# Patient Record
Sex: FEMALE | Race: BLACK OR AFRICAN AMERICAN | NOT HISPANIC OR LATINO | URBAN - METROPOLITAN AREA
[De-identification: names, ages, dates, MRNs, and addresses within clinical notes are randomized per-mention and may not be internally consistent; named-entity substitution may affect disease eponyms.]

---

## 2018-02-27 ENCOUNTER — EMERGENCY (EMERGENCY)
Facility: HOSPITAL | Age: 73
LOS: 1 days | Discharge: HOME | End: 2018-02-27
Attending: EMERGENCY MEDICINE
Payer: COMMERCIAL

## 2018-02-27 VITALS
OXYGEN SATURATION: 100 % | TEMPERATURE: 98 F | WEIGHT: 195.11 LBS | SYSTOLIC BLOOD PRESSURE: 140 MMHG | HEIGHT: 67 IN | HEART RATE: 77 BPM | DIASTOLIC BLOOD PRESSURE: 71 MMHG | RESPIRATION RATE: 16 BRPM

## 2018-02-27 PROCEDURE — 99283 EMERGENCY DEPT VISIT LOW MDM: CPT | Mod: 25

## 2018-02-27 PROCEDURE — 99284 EMERGENCY DEPT VISIT MOD MDM: CPT

## 2018-02-28 RX ORDER — POLYMYXIN B SULF/TRIMETHOPRIM 10000-1/ML
1 DROPS OPHTHALMIC (EYE)
Qty: 1 | Refills: 0
Start: 2018-02-28 | End: 2018-03-06

## 2018-02-28 NOTE — ED PROVIDER NOTE - MEDICAL DECISION MAKING DETAILS
71yo F presents with R eye irritation. Exam shows mild conjunctivitis. Will give rx abx drops and advised to f/u with ophthalmology

## 2018-02-28 NOTE — ED PROVIDER NOTE - OBJECTIVE STATEMENT
71 y/o F pt w/ no significant PMHx presents to ED c/o R eye irritation x3 days. Pt states that she used some Bengay and subsequently had rubbed her eye; since then pt has been feeling some irritation. Pt wears glasses but no contacts. Pt states that she noticed redness and slight swelling around her eye. Pt denies visual changes, discharge, or any other complaints. Pt reports no trauma. Pt is allergic to Penicillin (Rash).

## 2018-06-10 NOTE — ED PROVIDER NOTE - RESPIRATORY, MLM
-pain control with  morphine and dilauded   -CT scan with no evident of acute cervical spinal injury for neck pain and   -ct abd normal without stone Breath sounds clear and equal bilaterally.

## 2018-10-08 ENCOUNTER — APPOINTMENT (OUTPATIENT)
Dept: ORTHOPEDIC SURGERY | Facility: CLINIC | Age: 73
End: 2018-10-08
Payer: MEDICARE

## 2018-10-08 VITALS — BODY MASS INDEX: 31.39 KG/M2 | HEIGHT: 67 IN | WEIGHT: 200 LBS

## 2018-10-08 VITALS — SYSTOLIC BLOOD PRESSURE: 136 MMHG | HEART RATE: 70 BPM | DIASTOLIC BLOOD PRESSURE: 77 MMHG

## 2018-10-08 DIAGNOSIS — Z87.39 PERSONAL HISTORY OF OTHER DISEASES OF THE MUSCULOSKELETAL SYSTEM AND CONNECTIVE TISSUE: ICD-10-CM

## 2018-10-08 DIAGNOSIS — Z87.891 PERSONAL HISTORY OF NICOTINE DEPENDENCE: ICD-10-CM

## 2018-10-08 DIAGNOSIS — M75.101 UNSPECIFIED ROTATOR CUFF TEAR OR RUPTURE OF RIGHT SHOULDER, NOT SPECIFIED AS TRAUMATIC: ICD-10-CM

## 2018-10-08 DIAGNOSIS — M75.31 CALCIFIC TENDINITIS OF RIGHT SHOULDER: ICD-10-CM

## 2018-10-08 PROCEDURE — 73030 X-RAY EXAM OF SHOULDER: CPT | Mod: RT

## 2018-10-08 PROCEDURE — 99204 OFFICE O/P NEW MOD 45 MIN: CPT

## 2018-10-08 RX ORDER — DICLOFENAC SODIUM 10 MG/G
1 GEL TOPICAL DAILY
Qty: 1 | Refills: 0 | Status: ACTIVE | COMMUNITY
Start: 2018-10-08 | End: 1900-01-01

## 2019-07-31 ENCOUNTER — APPOINTMENT (OUTPATIENT)
Dept: ORTHOPEDIC SURGERY | Facility: CLINIC | Age: 74
End: 2019-07-31
Payer: MEDICARE

## 2019-07-31 VITALS
BODY MASS INDEX: 31.39 KG/M2 | HEART RATE: 79 BPM | SYSTOLIC BLOOD PRESSURE: 146 MMHG | HEIGHT: 67 IN | DIASTOLIC BLOOD PRESSURE: 73 MMHG | WEIGHT: 200 LBS

## 2019-07-31 DIAGNOSIS — M94.261 CHONDROMALACIA, RIGHT KNEE: ICD-10-CM

## 2019-07-31 PROCEDURE — 99214 OFFICE O/P EST MOD 30 MIN: CPT

## 2019-07-31 PROCEDURE — 73564 X-RAY EXAM KNEE 4 OR MORE: CPT | Mod: RT

## 2019-07-31 RX ORDER — ATENOLOL 50 MG/1
50 TABLET ORAL
Refills: 0 | Status: COMPLETED | COMMUNITY

## 2019-07-31 RX ORDER — DICLOFENAC SODIUM 10 MG/G
1 GEL TOPICAL DAILY
Qty: 1 | Refills: 2 | Status: ACTIVE | COMMUNITY
Start: 2019-07-31 | End: 1900-01-01

## 2019-07-31 RX ORDER — FOLIC ACID/VIT B COMPLEX AND C 400 MCG
TABLET ORAL
Refills: 0 | Status: COMPLETED | COMMUNITY

## 2019-07-31 RX ORDER — MENTHOL/CAMPHOR 0.5 %-0.5%
1000 LOTION (ML) TOPICAL
Refills: 0 | Status: COMPLETED | COMMUNITY

## 2019-07-31 RX ORDER — HYDROCHLOROTHIAZIDE AND TRIAMTERENE 25; 37.5 MG/1; MG/1
CAPSULE ORAL
Refills: 0 | Status: COMPLETED | COMMUNITY

## 2019-07-31 RX ORDER — DICLOFENAC SODIUM 16.05 MG/ML
1.5 SOLUTION TOPICAL
Qty: 1 | Refills: 0 | Status: ACTIVE | COMMUNITY
Start: 2019-07-31 | End: 1900-01-01

## 2019-07-31 RX ORDER — UBIDECARENONE 200 MG
500 CAPSULE ORAL
Refills: 0 | Status: COMPLETED | COMMUNITY

## 2019-07-31 NOTE — PHYSICAL EXAM
[de-identified] : 5 views of the affected Right knee (standing AP, flexing standing AP, 30degree flexed lateral, 0degree lateral, sunrise view)\par were ordered, obtained and evaluated by myself today and\par demonstrate:\par There is mild to moderate lateral narrowing\par Trace to small osteophytic lipping\par Trace suprapatellar effusion\par Osteophytes with mild patellofemoral joint space loss without evidence of tilt [or] subluxation on sunrise view\par Normal soft tissue density\par Otherwise normal osseous bone structure without fracture or dislocation [de-identified] : Physical Examination\par General: well nourished, in no acute distress, alert and oriented to person, place and time\par Psychiatric: normal mood and affect, no abnormal movements or speech patterns\par Eyes: vision intact - glasses\par Throat: no thyromegaly\par Lymph: no enlarged nodes, no lymphedema in extremity\par Respiratory: no wheezing, no shortness of breath with ambulation\par Cardiac: no cardiac leg swelling, 2+ peripheral pulses\par Neurology: normal gross sensation in extremities to light touch\par Abdomen: soft, non-tender, tympanic, no masses\par \par Musculoskeletal Examination\par Ambulation	+ antalgic gait, - assistive devices\par \par Knee			Right			Left\par General\par      Swelling/Deformity	normal			normal	\par      Skin			normal			normal\par      Erythema		-			-\par      Standing Alignment	neutral			neutral\par      Effusion		trace			none\par Range of Motion\par      Hip			full painless ROM		full painless ROM\par      Knee Flexion		125			130\par      Knee Extension	0			0\par Patella\par      J Sign		-			-\par      Quad Medial/Lateral	1/1 1/1\par      Apprehension		-			-\par      Alejandro's		+			-\par      Grind Sign		+			-\par      Crepitus		+			-\par Palpation\par      Medial Joint Line	+			-\par      Medial Fem Condyle	-			-\par      Lateral Joint Line	-			-\par      Quad Tendon		-			-\par      Patella Tendon	-			-\par      Medial Patella		-			-\par      Lateral Patella 	-			-\par      Posterior Knee	-			-\par Ligamentous\par      Varus @ 0° / 30°	-/-			-/-\par      Valgus @ 0° / 30°	-/-			-/-\par      Lachman		-			-\par      Pivot Shift		-			-\par      Anterior Drawer	-			-\par      Posterior Drawer	-			-\par Meniscus\par      Jeannie		=			-\par      Flexion Pinch		-			-\par Strength Examination/Atrophy\par      Hip Flexors 		5+			5+\par      Quadriceps		5+			5+\par      Hamstring		5+			5+\par      Tibialis Anterior	5+			5+\par      Achilles/Soleus	5+			5+\par Sensation\par      Deep Peroneal	normal			normal\par      Superficial Peroneal 	normal			normal\par      Sural  		normal			normal\par      Posterior Tibial 	normal			normal\par      Saphneous 		normal			normal\par Pulses\par      DP			2+			2+\par

## 2019-07-31 NOTE — HISTORY OF PRESENT ILLNESS
[de-identified] : CC Right knee\par \par HPI 75 yo female right HD presents with chronic onset of several months of activity related pain in the anterior Right knee [without injury]. The pain is worse, and rated a 8 out of 10, described as aching sharp burning throbbing, with radiation down the anterior calf. Rest makes the pain better and going up and down stairs makes the pain worse. The patient reports associated symptoms of of click locking grinding swelling. The patient - pain at night affecting sleep, and + similar pain previously.\par \par The patient has tried the following treatments:\par Activity modification	+\par Ice/Compression  	+\par Braces    		-\par Nsaids    		+\par Physical Therapy 	-\par Cortisone Injection	-\par Visco Injection		-\par Arthroscopy		-\par \par Review of Systems is positive for the above musculoskeletal symptoms and is otherwise non-contributory for general, constitutional, psychiatric, neurologic, HEENT, cardiac, respiratory, gastrointestinal, reproductive, lymphatic, and dermatologic complaints.\par \par Consult by Dr Anna Medina\par \par

## 2019-07-31 NOTE — DISCUSSION/SUMMARY
[de-identified] : Right knee osteophyte is predominantly in the patellofemoral and lateral compartments\par Possible medial compartment internal derangement\par \par The patient and I discussed the causes and progression of degenerative joint disease of the knee. Models, diagrams and drawings were used in the discussion. Treatment can include conservative non-operative management and surgical options. Conservative management includes weight loss, activity modification, physical therapy to improve motion and strength in the muscles around the knee and the body's core, PO and topical NSAIDs, corticosteroid and/or viscosupplementation intra-articular injections. If the patient fails to improve with non-operative management, surgical management is possible. Depending upon the patient's age, BMI, activity level, degree and location of arthrosis different surgical options are possible including arthroscopic debridement with chondroplasty, high-tibial osteotomy, unicondylar/partial arthroplasty, and total joint arthroplasty.\par Physical therapy was prescribed for knee ROM exercises, strengthening exercises, deep tissue massage, core strengthening, hip abductor strengthening, VMO strengthening, modalities PRN, and home exercises\par \par \par The patient was prescribed Diclofenac topical liquid/creme non-steroidal anti-inflammatory medication. 1-2 pumps twice daily and apply to area with pain. There is low systemic absorption of the medication but risks while reduced remain were discussed and include but not limited to renal damage and GI ulceration and bleeding. They were warned to stop the medication if worsening buring skin or gastric pain or dizziness or other side effects. Also to immediately stop the medication and seek appropriate medical attention if any severe stomach ache, gastritis, black/red vomit, black/red stools or any other medical concern.\par \par Patient declined injection\par \par The patient verifies their understanding the the visit, diagnosis and plan. They agree with the treatment plan and will contact the office with any questions or problems.\par Follow up\par PRN

## 2019-11-12 ENCOUNTER — APPOINTMENT (OUTPATIENT)
Dept: ORTHOPEDIC SURGERY | Facility: CLINIC | Age: 74
End: 2019-11-12
Payer: MEDICARE

## 2019-11-12 PROCEDURE — 20611 DRAIN/INJ JOINT/BURSA W/US: CPT | Mod: RT

## 2019-11-12 PROCEDURE — 99213 OFFICE O/P EST LOW 20 MIN: CPT | Mod: 25

## 2019-11-12 NOTE — DISCUSSION/SUMMARY
[de-identified] : Right knee osteophyte is predominantly in the patellofemoral and lateral compartments\par Possible lateral compartment internal derangement\par \par The patient and I discussed the causes and progression of degenerative joint disease of the knee. Models, diagrams and drawings were used in the discussion. Treatment can include conservative non-operative management and surgical options. Conservative management includes weight loss, activity modification, physical therapy to improve motion and strength in the muscles around the knee and the body's core, PO and topical NSAIDs, corticosteroid and/or viscosupplementation intra-articular injections. If the patient fails to improve with non-operative management, surgical management is possible. Depending upon the patient's age, BMI, activity level, degree and location of arthrosis different surgical options are possible including arthroscopic debridement with chondroplasty, high-tibial osteotomy, unicondylar/partial arthroplasty, and total joint arthroplasty.\par \par Physical therapy was prescribed for knee ROM exercises, strengthening exercises, deep tissue massage, core strengthening, hip abductor strengthening, VMO strengthening, modalities PRN, and home exercises\par \par Procedure Note:\par \par Verbal consent was obtained for an arthrocentesis and intra-articular corticosteroid injection of the RIGHT knee, after the risks and benefits were discussed with the patient. Potential adverse effects were discussed including but not limited to bleeding, skin/joint infection, local skin reactions including bleaching, bruising, stiffness, soreness, vasovagal episodes, transient hyperglycemia, avascular necrosis, pseudo-septic type reactions, post injection joint pain, allergic reaction to product or anesthetic and other rare but potential adverse effects along with benefits including decreased pain and improved stability prior to obtaining verbal informed consent. It was also discussed that for some patients the treatment is ineffective and there are no guarantees that the patient will experience improvement as the result of the injection. In rare occasions the injection can cause worsening of pain.\par \par The use of a Sonosite 15-6 MHz linear transducer with live ultrasound guidance of the knee was necessary given the patient's BMI and local body habitus overlying and obscuring the accurate identification of normal body bony anatomy used to identify the injection site and the depth of soft tissue envelope necessitating a longer than normal needle to reach the joint space, and to confirm the location of the needle tip and intra-articular delivery of the medication. Without the use of live ultrasound guidance the injection would have been more difficult and place the patient's neurovascular structures at risk from the longer needle needed to traverse the soft tissue envelope.\par \par A 6 inch bolster was placed underneath the knee to place the knee in a slightly flexed position. The superolateral injection site was identified using the ultrasound probe to identify the suprapatellar space and superior boarder of the patella first longitudinally and then transversely. The injection site was marked and prepped with a ChloraPrep swab and anesthetized with ethylchloride skin anesthesia. Using sterile technique a 20g 3 1/2 in spinal needle with 3 cc total of 1cc 1% lidocaine without epinephrine, 1cc 0.25% Marcaine without epinephrine and 1cc of 40mg/mL Kenalog was passed through the injection site towards the suprapatellar space under live ultrasound guidance and noted to penetrate the joint capsule. The medication was injected without resistance under live ultrasound visualization and noted to flow into the suprapatellar joint space. The injection site was sterilely dressed, there was minimal blood loss. The patient tolerated this procedure without any complications done by myself. Images were recorded and saved.\par \par The patient has been advised that if they notice any worsening of symptoms or any problems to contact me and seek care from a qualified medical professional. The patient was instructed to ice the knee and take NSAID medication on an as needed basis if the patient feels discomfort.\par \par The patient verifies their understanding the the visit, diagnosis and plan. They agree with the treatment plan and will contact the office with any questions or problems.\par Follow up\par PRN

## 2019-11-12 NOTE — PHYSICAL EXAM
[de-identified] : Physical Examination\par General: well nourished, in no acute distress, alert and oriented to person, place and time\par Psychiatric: normal mood and affect, no abnormal movements or speech patterns\par Eyes: vision intact - glasses\par Throat: no thyromegaly\par Lymph: no enlarged nodes, no lymphedema in extremity\par Respiratory: no wheezing, no shortness of breath with ambulation\par Cardiac: no cardiac leg swelling, 2+ peripheral pulses\par Neurology: normal gross sensation in extremities to light touch\par Abdomen: soft, non-tender, tympanic, no masses\par \par Musculoskeletal Examination\par Ambulation	+ antalgic gait, - assistive devices\par \par Knee			Right			Left\par General\par      Swelling/Deformity	normal			normal	\par      Skin			normal			normal\par      Erythema		-			-\par      Standing Alignment	neutral			neutral\par      Effusion		trace			none\par Range of Motion\par      Hip			full painless ROM		full painless ROM\par      Knee Flexion		100			130\par      Knee Extension	0			0\par Patella\par      J Sign		-			-\par      Quad Medial/Lateral	1/1 1/1\par      Apprehension		-			-\par      Alejandro's		+			-\par      Grind Sign		+			-\par      Crepitus		+			-\par Palpation\par      Medial Joint Line	-			-\par      Medial Fem Condyle	-			-\par      Lateral Joint Line	+			-\par      Quad Tendon		-			-\par      Patella Tendon	-			-\par      Medial Patella		-			-\par      Lateral Patella 	-			-\par      Posterior Knee	-			-\par Ligamentous\par      Varus @ 0° / 30°	-/-			-/-\par      Valgus @ 0° / 30°	-/-			-/-\par      Lachman		-			-\par      Pivot Shift		-			-\par      Anterior Drawer	-			-\par      Posterior Drawer	-			-\par Meniscus\par      Jeannie		=/+ lateral			-\par      Flexion Pinch		-			-\par Strength Examination/Atrophy\par      Hip Flexors 		5+			5+\par      Quadriceps		5+			5+\par      Hamstring		5+			5+\par      Tibialis Anterior	5+			5+\par      Achilles/Soleus	5+			5+\par Sensation\par      Deep Peroneal	normal			normal\par      Superficial Peroneal 	normal			normal\par      Sural  		normal			normal\par      Posterior Tibial 	normal			normal\par      Saphneous 		normal			normal\par Pulses\par      DP			2+			2+\par  [de-identified] : 5 views of the affected Right knee (standing AP, flexing standing AP, 30degree flexed lateral, 0degree lateral, sunrise view)\par 7-31-19\par demonstrate:\par There is mild to moderate lateral narrowing\par Trace to small osteophytic lipping\par Trace suprapatellar effusion\par Osteophytes with mild patellofemoral joint space loss without evidence of tilt [or] subluxation on sunrise view\par Normal soft tissue density\par Otherwise normal osseous bone structure without fracture or dislocation

## 2019-11-12 NOTE — HISTORY OF PRESENT ILLNESS
[de-identified] : CC Right knee\par \par HPI 73 yo female right HD presents for fu of chronic onset of several months of activity related pain in the anterior Right knee [without injury]. The pain is worse, and rated a 8 out of 10, described as aching sharp burning throbbing, with radiation down the anterior calf. Rest makes the pain better and going up and down stairs makes the pain worse. The patient reports associated symptoms of of click locking grinding swelling. The patient - pain at night affecting sleep, and + similar pain previously.\par \par The patient has tried the following treatments:\par Activity modification	+\par Ice/Compression  	+\par Braces    		-\par Nsaids    		+\par Physical Therapy 	-\par Cortisone Injection	-\par Visco Injection		-\par Arthroscopy		-\par \par pain worse, no PT or topical nsaids\par \par Review of Systems is positive for the above musculoskeletal symptoms and is otherwise non-contributory for general, constitutional, psychiatric, neurologic, HEENT, cardiac, respiratory, gastrointestinal, reproductive, lymphatic, and dermatologic complaints.\par \par Consult by Dr Anna Medina\par \par

## 2020-04-09 ENCOUNTER — EMERGENCY (EMERGENCY)
Facility: HOSPITAL | Age: 75
LOS: 1 days | Discharge: HOME | End: 2020-04-09
Attending: EMERGENCY MEDICINE
Payer: COMMERCIAL

## 2020-04-09 VITALS
SYSTOLIC BLOOD PRESSURE: 122 MMHG | OXYGEN SATURATION: 98 % | WEIGHT: 195.11 LBS | HEART RATE: 70 BPM | DIASTOLIC BLOOD PRESSURE: 66 MMHG | RESPIRATION RATE: 19 BRPM | HEIGHT: 67 IN | TEMPERATURE: 98 F

## 2020-04-09 LAB
ALBUMIN SERPL ELPH-MCNC: 3.6 G/DL — SIGNIFICANT CHANGE UP (ref 3.5–5)
ALP SERPL-CCNC: 70 U/L — SIGNIFICANT CHANGE UP (ref 40–120)
ALT FLD-CCNC: 21 U/L DA — SIGNIFICANT CHANGE UP (ref 10–60)
ANION GAP SERPL CALC-SCNC: 6 MMOL/L — SIGNIFICANT CHANGE UP (ref 5–17)
APPEARANCE UR: CLEAR — SIGNIFICANT CHANGE UP
AST SERPL-CCNC: 18 U/L — SIGNIFICANT CHANGE UP (ref 10–40)
BASOPHILS # BLD AUTO: 0.02 K/UL — SIGNIFICANT CHANGE UP (ref 0–0.2)
BASOPHILS NFR BLD AUTO: 0.2 % — SIGNIFICANT CHANGE UP (ref 0–2)
BILIRUB SERPL-MCNC: 0.6 MG/DL — SIGNIFICANT CHANGE UP (ref 0.2–1.2)
BILIRUB UR-MCNC: NEGATIVE — SIGNIFICANT CHANGE UP
BUN SERPL-MCNC: 11 MG/DL — SIGNIFICANT CHANGE UP (ref 7–18)
CALCIUM SERPL-MCNC: 9.4 MG/DL — SIGNIFICANT CHANGE UP (ref 8.4–10.5)
CHLORIDE SERPL-SCNC: 101 MMOL/L — SIGNIFICANT CHANGE UP (ref 96–108)
CO2 SERPL-SCNC: 31 MMOL/L — SIGNIFICANT CHANGE UP (ref 22–31)
COLOR SPEC: YELLOW — SIGNIFICANT CHANGE UP
CREAT SERPL-MCNC: 0.75 MG/DL — SIGNIFICANT CHANGE UP (ref 0.5–1.3)
DIFF PNL FLD: NEGATIVE — SIGNIFICANT CHANGE UP
EOSINOPHIL # BLD AUTO: 0.02 K/UL — SIGNIFICANT CHANGE UP (ref 0–0.5)
EOSINOPHIL NFR BLD AUTO: 0.2 % — SIGNIFICANT CHANGE UP (ref 0–6)
GLUCOSE SERPL-MCNC: 112 MG/DL — HIGH (ref 70–99)
GLUCOSE UR QL: NEGATIVE — SIGNIFICANT CHANGE UP
HCT VFR BLD CALC: 40.9 % — SIGNIFICANT CHANGE UP (ref 34.5–45)
HGB BLD-MCNC: 13.2 G/DL — SIGNIFICANT CHANGE UP (ref 11.5–15.5)
IMM GRANULOCYTES NFR BLD AUTO: 0.3 % — SIGNIFICANT CHANGE UP (ref 0–1.5)
KETONES UR-MCNC: NEGATIVE — SIGNIFICANT CHANGE UP
LEUKOCYTE ESTERASE UR-ACNC: ABNORMAL
LIDOCAIN IGE QN: 110 U/L — SIGNIFICANT CHANGE UP (ref 73–393)
LYMPHOCYTES # BLD AUTO: 0.93 K/UL — LOW (ref 1–3.3)
LYMPHOCYTES # BLD AUTO: 10.7 % — LOW (ref 13–44)
MCHC RBC-ENTMCNC: 26.9 PG — LOW (ref 27–34)
MCHC RBC-ENTMCNC: 32.3 GM/DL — SIGNIFICANT CHANGE UP (ref 32–36)
MCV RBC AUTO: 83.5 FL — SIGNIFICANT CHANGE UP (ref 80–100)
MONOCYTES # BLD AUTO: 0.48 K/UL — SIGNIFICANT CHANGE UP (ref 0–0.9)
MONOCYTES NFR BLD AUTO: 5.5 % — SIGNIFICANT CHANGE UP (ref 2–14)
NEUTROPHILS # BLD AUTO: 7.24 K/UL — SIGNIFICANT CHANGE UP (ref 1.8–7.4)
NEUTROPHILS NFR BLD AUTO: 83.1 % — HIGH (ref 43–77)
NITRITE UR-MCNC: NEGATIVE — SIGNIFICANT CHANGE UP
NRBC # BLD: 0 /100 WBCS — SIGNIFICANT CHANGE UP (ref 0–0)
NT-PROBNP SERPL-SCNC: 87 PG/ML — SIGNIFICANT CHANGE UP (ref 0–450)
PH UR: 7 — SIGNIFICANT CHANGE UP (ref 5–8)
PLATELET # BLD AUTO: 243 K/UL — SIGNIFICANT CHANGE UP (ref 150–400)
POTASSIUM SERPL-MCNC: 3.1 MMOL/L — LOW (ref 3.5–5.3)
POTASSIUM SERPL-SCNC: 3.1 MMOL/L — LOW (ref 3.5–5.3)
PROT SERPL-MCNC: 8.3 G/DL — SIGNIFICANT CHANGE UP (ref 6–8.3)
PROT UR-MCNC: NEGATIVE — SIGNIFICANT CHANGE UP
RBC # BLD: 4.9 M/UL — SIGNIFICANT CHANGE UP (ref 3.8–5.2)
RBC # FLD: 13.6 % — SIGNIFICANT CHANGE UP (ref 10.3–14.5)
SARS-COV-2 RNA SPEC QL NAA+PROBE: SIGNIFICANT CHANGE UP
SODIUM SERPL-SCNC: 138 MMOL/L — SIGNIFICANT CHANGE UP (ref 135–145)
SP GR SPEC: 1 — LOW (ref 1.01–1.02)
TROPONIN I SERPL-MCNC: <0.015 NG/ML — SIGNIFICANT CHANGE UP (ref 0–0.04)
TSH SERPL-MCNC: 0.77 UU/ML — SIGNIFICANT CHANGE UP (ref 0.34–4.82)
UROBILINOGEN FLD QL: NEGATIVE — SIGNIFICANT CHANGE UP
WBC # BLD: 8.72 K/UL — SIGNIFICANT CHANGE UP (ref 3.8–10.5)
WBC # FLD AUTO: 8.72 K/UL — SIGNIFICANT CHANGE UP (ref 3.8–10.5)

## 2020-04-09 PROCEDURE — 36415 COLL VENOUS BLD VENIPUNCTURE: CPT

## 2020-04-09 PROCEDURE — 87635 SARS-COV-2 COVID-19 AMP PRB: CPT

## 2020-04-09 PROCEDURE — 71046 X-RAY EXAM CHEST 2 VIEWS: CPT | Mod: 26

## 2020-04-09 PROCEDURE — 99283 EMERGENCY DEPT VISIT LOW MDM: CPT | Mod: 25

## 2020-04-09 PROCEDURE — 85027 COMPLETE CBC AUTOMATED: CPT

## 2020-04-09 PROCEDURE — 83690 ASSAY OF LIPASE: CPT

## 2020-04-09 PROCEDURE — 93010 ELECTROCARDIOGRAM REPORT: CPT

## 2020-04-09 PROCEDURE — 87086 URINE CULTURE/COLONY COUNT: CPT

## 2020-04-09 PROCEDURE — 80053 COMPREHEN METABOLIC PANEL: CPT

## 2020-04-09 PROCEDURE — 81001 URINALYSIS AUTO W/SCOPE: CPT

## 2020-04-09 PROCEDURE — 84443 ASSAY THYROID STIM HORMONE: CPT

## 2020-04-09 PROCEDURE — 83880 ASSAY OF NATRIURETIC PEPTIDE: CPT

## 2020-04-09 PROCEDURE — 84484 ASSAY OF TROPONIN QUANT: CPT

## 2020-04-09 PROCEDURE — 99283 EMERGENCY DEPT VISIT LOW MDM: CPT

## 2020-04-09 PROCEDURE — 71046 X-RAY EXAM CHEST 2 VIEWS: CPT

## 2020-04-09 PROCEDURE — 93005 ELECTROCARDIOGRAM TRACING: CPT

## 2020-04-09 RX ORDER — NITROFURANTOIN MACROCRYSTAL 50 MG
1 CAPSULE ORAL
Qty: 14 | Refills: 0
Start: 2020-04-09 | End: 2020-04-15

## 2020-04-09 NOTE — ED PROVIDER NOTE - CLINICAL SUMMARY MEDICAL DECISION MAKING FREE TEXT BOX
75 year old female with feeling weak and back/body pains. vitals WNL. PE as above. 75 year old female with feeling weak and back/body pains. vitals WNL. PE as above.  ecg unremarkable. cxr WNL. labs WNL. ua with +uti. feels well in ED. will dc. f/u with PMD. return precautions discussed.

## 2020-04-09 NOTE — ED PROVIDER NOTE - OBJECTIVE STATEMENT
75 year old female PMH HTN coming in with few days of fatigue, body aches, mild nausea, and left sided abd/flank pain that has resolved. states just "feels off." Denies fevers, chills, sweats, cough, urinary complaints, V/D/C. no sick contacts. no recent travel. [facility] has a tele medicine call with PMD tomorrow but wasn't feeling well so came in for eval.

## 2020-04-09 NOTE — ED ADULT NURSE NOTE - NSIMPLEMENTINTERV_GEN_ALL_ED
Implemented All Universal Safety Interventions:  [place] to call system. Call bell, personal items and telephone within reach. Instruct patient to call for assistance. Room bathroom lighting operational. Non-slip footwear when patient is off stretcher. Physically safe environment: no spills, clutter or unnecessary equipment. Stretcher in lowest position, wheels locked, appropriate side rails in place.

## 2020-04-09 NOTE — ED PROVIDER NOTE - PATIENT PORTAL LINK FT
You can access the FollowMyHealth Patient Portal offered by [facility] by registering at the following website: [email or web address]. By joining [employer]’s FollowMyHealth portal, you will also be able to view your health information using other applications (apps) compatible with our system.

## 2020-04-10 LAB
CULTURE RESULTS: SIGNIFICANT CHANGE UP
SPECIMEN SOURCE: SIGNIFICANT CHANGE UP

## 2020-04-20 ENCOUNTER — EMERGENCY (EMERGENCY)
Facility: HOSPITAL | Age: 75
LOS: 1 days | Discharge: HOME | End: 2020-04-20
Attending: STUDENT IN AN ORGANIZED HEALTH CARE EDUCATION/TRAINING PROGRAM
Payer: COMMERCIAL

## 2020-04-20 VITALS
HEIGHT: 67 IN | RESPIRATION RATE: 18 BRPM | OXYGEN SATURATION: 99 % | TEMPERATURE: 98 F | WEIGHT: 190.04 LBS | DIASTOLIC BLOOD PRESSURE: 80 MMHG | HEART RATE: 74 BPM | SYSTOLIC BLOOD PRESSURE: 175 MMHG

## 2020-04-20 NOTE — ED ADULT TRIAGE NOTE - CHIEF COMPLAINT QUOTE
Left flank pain for 2 days, no N/V, hematuria, or fever. Finished antibiotics on thursday for UTI, also c/o of high /100 since flank pain

## 2020-04-21 VITALS
OXYGEN SATURATION: 100 % | TEMPERATURE: 98 F | SYSTOLIC BLOOD PRESSURE: 122 MMHG | DIASTOLIC BLOOD PRESSURE: 69 MMHG | RESPIRATION RATE: 17 BRPM | HEART RATE: 69 BPM

## 2020-04-21 LAB
ALBUMIN SERPL ELPH-MCNC: 3.3 G/DL — LOW (ref 3.5–5)
ALP SERPL-CCNC: 72 U/L — SIGNIFICANT CHANGE UP (ref 40–120)
ALT FLD-CCNC: 22 U/L DA — SIGNIFICANT CHANGE UP (ref 10–60)
ANION GAP SERPL CALC-SCNC: 5 MMOL/L — SIGNIFICANT CHANGE UP (ref 5–17)
APPEARANCE UR: CLEAR — SIGNIFICANT CHANGE UP
APTT BLD: 33.9 SEC — SIGNIFICANT CHANGE UP (ref 27.5–36.3)
AST SERPL-CCNC: 16 U/L — SIGNIFICANT CHANGE UP (ref 10–40)
BASOPHILS # BLD AUTO: 0.02 K/UL — SIGNIFICANT CHANGE UP (ref 0–0.2)
BASOPHILS NFR BLD AUTO: 0.2 % — SIGNIFICANT CHANGE UP (ref 0–2)
BILIRUB SERPL-MCNC: 0.4 MG/DL — SIGNIFICANT CHANGE UP (ref 0.2–1.2)
BILIRUB UR-MCNC: NEGATIVE — SIGNIFICANT CHANGE UP
BUN SERPL-MCNC: 7 MG/DL — SIGNIFICANT CHANGE UP (ref 7–18)
CALCIUM SERPL-MCNC: 9 MG/DL — SIGNIFICANT CHANGE UP (ref 8.4–10.5)
CHLORIDE SERPL-SCNC: 101 MMOL/L — SIGNIFICANT CHANGE UP (ref 96–108)
CO2 SERPL-SCNC: 33 MMOL/L — HIGH (ref 22–31)
COLOR SPEC: YELLOW — SIGNIFICANT CHANGE UP
CREAT SERPL-MCNC: 0.79 MG/DL — SIGNIFICANT CHANGE UP (ref 0.5–1.3)
DIFF PNL FLD: NEGATIVE — SIGNIFICANT CHANGE UP
EOSINOPHIL # BLD AUTO: 0.07 K/UL — SIGNIFICANT CHANGE UP (ref 0–0.5)
EOSINOPHIL NFR BLD AUTO: 0.8 % — SIGNIFICANT CHANGE UP (ref 0–6)
GLUCOSE SERPL-MCNC: 101 MG/DL — HIGH (ref 70–99)
GLUCOSE UR QL: NEGATIVE — SIGNIFICANT CHANGE UP
HCT VFR BLD CALC: 39.4 % — SIGNIFICANT CHANGE UP (ref 34.5–45)
HGB BLD-MCNC: 12.6 G/DL — SIGNIFICANT CHANGE UP (ref 11.5–15.5)
IMM GRANULOCYTES NFR BLD AUTO: 0.1 % — SIGNIFICANT CHANGE UP (ref 0–1.5)
INR BLD: 1.09 RATIO — SIGNIFICANT CHANGE UP (ref 0.88–1.16)
KETONES UR-MCNC: NEGATIVE — SIGNIFICANT CHANGE UP
LEUKOCYTE ESTERASE UR-ACNC: NEGATIVE — SIGNIFICANT CHANGE UP
LYMPHOCYTES # BLD AUTO: 1.38 K/UL — SIGNIFICANT CHANGE UP (ref 1–3.3)
LYMPHOCYTES # BLD AUTO: 15.2 % — SIGNIFICANT CHANGE UP (ref 13–44)
MCHC RBC-ENTMCNC: 26.9 PG — LOW (ref 27–34)
MCHC RBC-ENTMCNC: 32 GM/DL — SIGNIFICANT CHANGE UP (ref 32–36)
MCV RBC AUTO: 84.2 FL — SIGNIFICANT CHANGE UP (ref 80–100)
MONOCYTES # BLD AUTO: 0.69 K/UL — SIGNIFICANT CHANGE UP (ref 0–0.9)
MONOCYTES NFR BLD AUTO: 7.6 % — SIGNIFICANT CHANGE UP (ref 2–14)
NEUTROPHILS # BLD AUTO: 6.91 K/UL — SIGNIFICANT CHANGE UP (ref 1.8–7.4)
NEUTROPHILS NFR BLD AUTO: 76.1 % — SIGNIFICANT CHANGE UP (ref 43–77)
NITRITE UR-MCNC: NEGATIVE — SIGNIFICANT CHANGE UP
NRBC # BLD: 0 /100 WBCS — SIGNIFICANT CHANGE UP (ref 0–0)
PH UR: 6 — SIGNIFICANT CHANGE UP (ref 5–8)
PLATELET # BLD AUTO: 264 K/UL — SIGNIFICANT CHANGE UP (ref 150–400)
POTASSIUM SERPL-MCNC: 3.4 MMOL/L — LOW (ref 3.5–5.3)
POTASSIUM SERPL-SCNC: 3.4 MMOL/L — LOW (ref 3.5–5.3)
PROT SERPL-MCNC: 7.8 G/DL — SIGNIFICANT CHANGE UP (ref 6–8.3)
PROT UR-MCNC: NEGATIVE — SIGNIFICANT CHANGE UP
PROTHROM AB SERPL-ACNC: 12.3 SEC — SIGNIFICANT CHANGE UP (ref 10–12.9)
RBC # BLD: 4.68 M/UL — SIGNIFICANT CHANGE UP (ref 3.8–5.2)
RBC # FLD: 14.1 % — SIGNIFICANT CHANGE UP (ref 10.3–14.5)
SODIUM SERPL-SCNC: 139 MMOL/L — SIGNIFICANT CHANGE UP (ref 135–145)
SP GR SPEC: 1 — LOW (ref 1.01–1.02)
UROBILINOGEN FLD QL: NEGATIVE — SIGNIFICANT CHANGE UP
WBC # BLD: 9.08 K/UL — SIGNIFICANT CHANGE UP (ref 3.8–10.5)
WBC # FLD AUTO: 9.08 K/UL — SIGNIFICANT CHANGE UP (ref 3.8–10.5)

## 2020-04-21 PROCEDURE — 85027 COMPLETE CBC AUTOMATED: CPT

## 2020-04-21 PROCEDURE — 80053 COMPREHEN METABOLIC PANEL: CPT

## 2020-04-21 PROCEDURE — 36415 COLL VENOUS BLD VENIPUNCTURE: CPT

## 2020-04-21 PROCEDURE — 81003 URINALYSIS AUTO W/O SCOPE: CPT

## 2020-04-21 PROCEDURE — 85730 THROMBOPLASTIN TIME PARTIAL: CPT

## 2020-04-21 PROCEDURE — 85610 PROTHROMBIN TIME: CPT

## 2020-04-21 PROCEDURE — 87086 URINE CULTURE/COLONY COUNT: CPT

## 2020-04-21 PROCEDURE — 99283 EMERGENCY DEPT VISIT LOW MDM: CPT

## 2020-04-21 PROCEDURE — 99284 EMERGENCY DEPT VISIT MOD MDM: CPT

## 2020-04-21 NOTE — ED PROVIDER NOTE - PHYSICAL EXAMINATION
NAD  EOMI  airway patent  +S1S2 no mrg  CTA b/l  soft nt nd  no le edema;  normal mood and affect, pleasant  no cva b/l, no rash;

## 2020-04-21 NOTE — ED PROVIDER NOTE - CLINICAL SUMMARY MEDICAL DECISION MAKING FREE TEXT BOX
transient r flank pain, improved dysuria, tolerating po. recent culture negative, benign exam, not hypoxic, symptoms likely related to covid, low suspicion pyleo given presentation and recent urine culture, will dc with pcp follow up and strict return precautions

## 2020-04-21 NOTE — ED ADULT NURSE NOTE - OBJECTIVE STATEMENT
RN JASSON SY COVERING NOTES: AOX4 +ambulatory patient reports L flank pain x 2 days. Patient states finished abx for for UTI

## 2020-04-21 NOTE — ED PROVIDER NOTE - OBJECTIVE STATEMENT
75f with h/o recent likely covid infection (tested negative but lymphopenia and symptoms suggestive of), dysuria s/p abx, now p/ intermittetn r lower back pain; no dysuria/hematuria/cough/cold/diarrhea/dysuria

## 2020-04-21 NOTE — ED PROVIDER NOTE - PATIENT PORTAL LINK FT
You can access the FollowMyHealth Patient Portal offered by [facility] by registering at the following website: [email or web address]. By joining [employer]’s FollowMyHealth portal, you will also be able to view your health information using other applications (apps) compatible with our system.

## 2020-04-22 LAB
CULTURE RESULTS: SIGNIFICANT CHANGE UP
SPECIMEN SOURCE: SIGNIFICANT CHANGE UP

## 2020-06-12 ENCOUNTER — RESULT REVIEW (OUTPATIENT)
Age: 75
End: 2020-06-12

## 2020-09-11 ENCOUNTER — APPOINTMENT (OUTPATIENT)
Dept: ORTHOPEDIC SURGERY | Facility: CLINIC | Age: 75
End: 2020-09-11
Payer: MEDICARE

## 2020-09-11 VITALS
BODY MASS INDEX: 30.61 KG/M2 | WEIGHT: 195 LBS | OXYGEN SATURATION: 98 % | HEIGHT: 67 IN | DIASTOLIC BLOOD PRESSURE: 82 MMHG | SYSTOLIC BLOOD PRESSURE: 148 MMHG | HEART RATE: 71 BPM

## 2020-09-11 DIAGNOSIS — M17.11 UNILATERAL PRIMARY OSTEOARTHRITIS, RIGHT KNEE: ICD-10-CM

## 2020-09-11 PROCEDURE — 73564 X-RAY EXAM KNEE 4 OR MORE: CPT | Mod: RT

## 2020-09-11 PROCEDURE — 20611 DRAIN/INJ JOINT/BURSA W/US: CPT | Mod: RT

## 2020-09-11 PROCEDURE — 99214 OFFICE O/P EST MOD 30 MIN: CPT | Mod: 25

## 2020-09-11 RX ORDER — DICLOFENAC SODIUM 20 MG/G
2 SOLUTION TOPICAL
Qty: 1 | Refills: 0 | Status: ACTIVE | COMMUNITY
Start: 2020-09-11 | End: 1900-01-01

## 2020-09-11 NOTE — HISTORY OF PRESENT ILLNESS
[de-identified] : CC Right knee\par \par HPI 73 yo female right HD presents for pt and csi fu of chronic onset of several months of activity related pain in the anterior Right knee [without injury]. The pain is worse, and rated a 8 out of 10, described as aching sharp burning throbbing, with radiation down the anterior calf. Rest makes the pain better and going up and down stairs makes the pain worse. The patient reports associated symptoms of of click locking grinding swelling. The patient - pain at night affecting sleep, and + similar pain previously.\par \par The patient has tried the following treatments:\par Activity modification	+\par Ice/Compression  	+\par Braces    		-\par Nsaids    		+\par Physical Therapy 	- \par Cortisone Injection	\par Visco Injection		-\par Arthroscopy		-\par \par 6-7 months improvement with CSI didn't do PT\par knee feels weak\par \par Review of Systems is positive for the above musculoskeletal symptoms and is otherwise non-contributory for general, constitutional, psychiatric, neurologic, HEENT, cardiac, respiratory, gastrointestinal, reproductive, lymphatic, and dermatologic complaints.\par \par Consult by Dr Anna Medina\par \par

## 2020-09-11 NOTE — PHYSICAL EXAM
[de-identified] : Physical Examination\par General: well nourished, in no acute distress, alert and oriented to person, place and time\par Psychiatric: normal mood and affect, no abnormal movements or speech patterns\par Eyes: vision intact - glasses\par Throat: no thyromegaly\par Lymph: no enlarged nodes, no lymphedema in extremity\par Respiratory: no wheezing, no shortness of breath with ambulation\par Cardiac: no cardiac leg swelling, 2+ peripheral pulses\par Neurology: normal gross sensation in extremities to light touch\par Abdomen: soft, non-tender, tympanic, no masses\par \par Musculoskeletal Examination\par Ambulation	+ antalgic gait, - assistive devices\par \par Knee			Right			Left\par General\par      Swelling/Deformity	normal			normal	\par      Skin			normal			normal\par      Erythema		-			-\par      Standing Alignment	neutral			neutral\par      Effusion		trace			none\par Range of Motion\par      Hip			full painless ROM		full painless ROM\par      Knee Flexion		100			130\par      Knee Extension	0			0\par Patella\par      J Sign		-			-\par      Quad Medial/Lateral	1/1 1/1\par      Apprehension		-			-\par      Alejandro's		+			-\par      Grind Sign		+			-\par      Crepitus		+			-\par Palpation\par      Medial Joint Line	-			-\par      Medial Fem Condyle	-			-\par      Lateral Joint Line	+			-\par      Quad Tendon		-			-\par      Patella Tendon	-			-\par      Medial Patella		-			-\par      Lateral Patella 	-			-\par      Posterior Knee	-			-\par Ligamentous\par      Varus @ 0° / 30°	-/-			-/-\par      Valgus @ 0° / 30°	-/-			-/-\par      Lachman		-			-\par      Pivot Shift		-			-\par      Anterior Drawer	-			-\par      Posterior Drawer	-			-\par Meniscus\par      Jeannie		=/+ lateral			-\par      Flexion Pinch		-			-\par Strength Examination/Atrophy\par      Hip Flexors 		5+			5+\par      Quadriceps		5+			5+\par      Hamstring		5+			5+\par      Tibialis Anterior	5+			5+\par      Achilles/Soleus	5+			5+\par Sensation\par      Deep Peroneal	normal			normal\par      Superficial Peroneal 	normal			normal\par      Sural  		normal			normal\par      Posterior Tibial 	normal			normal\par      Saphneous 		normal			normal\par Pulses\par      DP			2+			2+\par  [de-identified] : 5 views of the affected Right knee (standing AP, flexing standing AP, 30degree flexed lateral, 0degree lateral, sunrise view)\par 7-31-19\par demonstrate:\par There is mild to moderate lateral narrowing\par Trace to small osteophytic lipping\par Trace suprapatellar effusion\par Osteophytes with mild patellofemoral joint space loss without evidence of tilt [or] subluxation on sunrise view\par Normal soft tissue density\par Otherwise normal osseous bone structure without fracture or dislocation\par \par 5 views of the affected Right knee (standing AP, flexing standing AP, 30degree flexed lateral, 0degree lateral, sunrise view)\par were ordered, obtained and evaluated by myself today and\par demonstrate:\par There is moderate lateral narrowing\par Trace to small osteophytic lipping\par Small suprapatellar effusion\par Osteophytes with mild patellofemoral joint space loss without evidence of tilt [or] subluxation on sunrise view\par Normal soft tissue density\par Otherwise normal osseous bone structure without fracture or dislocation

## 2020-09-11 NOTE — DISCUSSION/SUMMARY
[de-identified] : Right knee osteophyte is predominantly in the patellofemoral and lateral compartments\par Possible lateral compartment internal derangement\par \par The patient and I discussed the causes and progression of degenerative joint disease of the knee. Models, diagrams and drawings were used in the discussion. Treatment can include conservative non-operative management and surgical options. Conservative management includes weight loss, activity modification, physical therapy to improve motion and strength in the muscles around the knee and the body's core, PO and topical NSAIDs, corticosteroid and/or viscosupplementation intra-articular injections. If the patient fails to improve with non-operative management, surgical management is possible. Depending upon the patient's age, BMI, activity level, degree and location of arthrosis different surgical options are possible including arthroscopic debridement with chondroplasty, high-tibial osteotomy, unicondylar/partial arthroplasty, and total joint arthroplasty.\par \par Physical therapy was prescribed for knee ROM exercises, strengthening exercises, deep tissue massage, core strengthening, hip abductor strengthening, VMO strengthening, modalities PRN, and home exercises\par \par Procedure Note:\par \par Verbal consent was obtained for an arthrocentesis and intra-articular corticosteroid injection of the RIGHT knee, after the risks and benefits were discussed with the patient. Potential adverse effects were discussed including but not limited to bleeding, skin/joint infection, local skin reactions including bleaching, bruising, stiffness, soreness, vasovagal episodes, transient hyperglycemia, avascular necrosis, pseudo-septic type reactions, post injection joint pain, allergic reaction to product or anesthetic and other rare but potential adverse effects along with benefits including decreased pain and improved stability prior to obtaining verbal informed consent. It was also discussed that for some patients the treatment is ineffective and there are no guarantees that the patient will experience improvement as the result of the injection. In rare occasions the injection can cause worsening of pain.\par \par The use of a Sonosite 15-6 MHz linear transducer with live ultrasound guidance of the knee was necessary given the patient's BMI and local body habitus overlying and obscuring the accurate identification of normal body bony anatomy used to identify the injection site and the depth of soft tissue envelope necessitating a longer than normal needle to reach the joint space, and to confirm the location of the needle tip and intra-articular delivery of the medication. Without the use of live ultrasound guidance the injection would have been more difficult and place the patient's neurovascular structures at risk from the longer needle needed to traverse the soft tissue envelope.\par \par A 6 inch bolster was placed underneath the knee to place the knee in a slightly flexed position. The superolateral injection site was identified using the ultrasound probe to identify the suprapatellar space and superior boarder of the patella first longitudinally and then transversely. The injection site was marked and prepped with a ChloraPrep swab and anesthetized with ethylchloride skin anesthesia. Using sterile technique a 20g 3 1/2 in spinal needle with 3 cc total of 1cc 1% lidocaine without epinephrine, 1cc 0.25% Marcaine without epinephrine and 1cc of 40mg/mL Kenalog was passed through the injection site towards the suprapatellar space under live ultrasound guidance and noted to penetrate the joint capsule. The medication was injected without resistance under live ultrasound visualization and noted to flow into the suprapatellar joint space. The injection site was sterilely dressed, there was minimal blood loss. The patient tolerated this procedure without any complications done by myself. Images were recorded and saved.\par \par The patient has been advised that if they notice any worsening of symptoms or any problems to contact me and seek care from a qualified medical professional. The patient was instructed to ice the knee and take NSAID medication on an as needed basis if the patient feels discomfort.\par \par \par Hinged knee brace\par \par The patient was prescribed Diclofenac topical liquid/creme non-steroidal anti-inflammatory medication. 1-2 pumps twice daily and apply to area with pain. There is low systemic absorption of the medication but risks while reduced remain were discussed and include but not limited to renal damage and GI ulceration and bleeding. They were warned to stop the medication if worsening buring skin or gastric pain or dizziness or other side effects. Also to immediately stop the medication and seek appropriate medical attention if any severe stomach ache, gastritis, black/red vomit, black/red stools or any other medical concern.\par \par all questions answered\par The patient verifies their understanding the the visit, diagnosis and plan. They agree with the treatment plan and will contact the office with any questions or problems.\par \par Follow up\par PRN

## 2020-12-04 ENCOUNTER — EMERGENCY (EMERGENCY)
Facility: HOSPITAL | Age: 75
LOS: 1 days | Discharge: HOME | End: 2020-12-04
Attending: EMERGENCY MEDICINE
Payer: COMMERCIAL

## 2020-12-04 ENCOUNTER — APPOINTMENT (OUTPATIENT)
Dept: VASCULAR SURGERY | Facility: CLINIC | Age: 75
End: 2020-12-04

## 2020-12-04 VITALS
RESPIRATION RATE: 16 BRPM | HEART RATE: 94 BPM | DIASTOLIC BLOOD PRESSURE: 89 MMHG | OXYGEN SATURATION: 100 % | HEIGHT: 67 IN | SYSTOLIC BLOOD PRESSURE: 175 MMHG | WEIGHT: 199.96 LBS | TEMPERATURE: 98 F

## 2020-12-04 PROCEDURE — 99283 EMERGENCY DEPT VISIT LOW MDM: CPT

## 2020-12-04 NOTE — ED ADULT TRIAGE NOTE - CHIEF COMPLAINT QUOTE
patient states " I was exposed on 24th to someone who has corona virus and yesterday I feel something weird, my throat feels scratchy and short of breath today '"

## 2020-12-05 LAB
RAPID RVP RESULT: SIGNIFICANT CHANGE UP
SARS-COV-2 RNA SPEC QL NAA+PROBE: SIGNIFICANT CHANGE UP

## 2020-12-05 PROCEDURE — 0225U NFCT DS DNA&RNA 21 SARSCOV2: CPT

## 2020-12-05 PROCEDURE — 99283 EMERGENCY DEPT VISIT LOW MDM: CPT | Mod: 25

## 2020-12-05 PROCEDURE — 71046 X-RAY EXAM CHEST 2 VIEWS: CPT | Mod: 26

## 2020-12-05 PROCEDURE — 71046 X-RAY EXAM CHEST 2 VIEWS: CPT

## 2020-12-05 NOTE — ED PROVIDER NOTE - CLINICAL SUMMARY MEDICAL DECISION MAKING FREE TEXT BOX
75 year old female with uri symptoms. vitals WNL. PE as above.  PE unremarkable. cxr no infiltrate. rvp sent pending result. will dc. f/u PMD. return precautions discussed.

## 2020-12-05 NOTE — ED ADULT NURSE NOTE - OBJECTIVE STATEMENT
Pt stated around the 24 she was with someone who was positive for corona virus and now her throat feels itchy and sometimes she feels sob. Stated she wants to be tested for corona virus.

## 2020-12-05 NOTE — ED PROVIDER NOTE - OBJECTIVE STATEMENT
75 year old female PMH HTN coming in for covid test. pt states about 11 days ago was in contact briefly with someone who tested positive for covid and pt states has had some rinorrhea, mild throat scratchiness, and occasionally felt SOB. was concerned that this could be 2/2 for covid and wanted to get a test and a cxr. denies fevers, chills, sweats, cp, palpitations, cough, abd pains, n/v/D/C, urinary complaints, msk pains.

## 2020-12-05 NOTE — ED PROVIDER NOTE - NSFOLLOWUPINSTRUCTIONS_ED_ALL_ED_FT
EnglishSpanish                                                                                                                                                                                                                                                                                                           Viral Illness, Adult      Viruses are tiny germs that can get into a person's body and cause illness. There are many different types of viruses, and they cause many types of illness. Viral illnesses can range from mild to severe. They can affect various parts of the body.    Common illnesses that are caused by a virus include colds and the flu. Viral illnesses also include serious conditions such as HIV/AIDS (human immunodeficiency virus/acquired immunodeficiency syndrome). A few viruses have been linked to certain cancers.      What are the causes?    Many types of viruses can cause illness. Viruses invade cells in your body, multiply, and cause the infected cells to malfunction or die. When the cell dies, it releases more of the virus. When this happens, you develop symptoms of the illness, and the virus continues to spread to other cells. If the virus takes over the function of the cell, it can cause the cell to divide and grow out of control, as is the case when a virus causes cancer.  Different viruses get into the body in different ways. You can get a virus by:  •Swallowing food or water that is contaminated with the virus.      •Breathing in droplets that have been coughed or sneezed into the air by an infected person.      •Touching a surface that has been contaminated with the virus and then touching your eyes, nose, or mouth.      •Being bitten by an insect or animal that carries the virus.      •Having sexual contact with a person who is infected with the virus.      •Being exposed to blood or fluids that contain the virus, either through an open cut or during a transfusion.      If a virus enters your body, your body's defense system (immune system) will try to fight the virus. You may be at higher risk for a viral illness if your immune system is weak.      What are the signs or symptoms?    Symptoms vary depending on the type of virus and the location of the cells that it invades. Common symptoms of the main types of viral illnesses include:    Cold and flu viruses      •Fever.      •Headache.      •Sore throat.      •Muscle aches.      •Nasal congestion.      •Cough.      Digestive system (gastrointestinal) viruses      •Fever.      •Abdominal pain.      •Nausea.      •Diarrhea.      Liver viruses (hepatitis)      •Loss of appetite.      •Tiredness.      •Yellowing of the skin (jaundice).      Brain and spinal cord viruses      •Fever.      •Headache.      •Stiff neck.      •Nausea and vomiting.      •Confusion or sleepiness.      Skin viruses      •Warts.      •Itching.      •Rash.      Sexually transmitted viruses      •Discharge.      •Swelling.      •Redness.      •Rash.        How is this treated?  Viruses can be difficult to treat because they live within cells. Antibiotic medicines do not treat viruses because these drugs do not get inside cells. Treatment for a viral illness may include:  •Resting and drinking plenty of fluids.      •Medicines to relieve symptoms. These can include over-the-counter medicine for pain and fever, medicines for cough or congestion, and medicines to relieve diarrhea.      •Antiviral medicines. These drugs are available only for certain types of viruses. They may help reduce flu symptoms if taken early. There are also many antiviral medicines for hepatitis and HIV/AIDS.      Some viral illnesses can be prevented with vaccinations. A common example is the flu shot.      Follow these instructions at home:      Medicines      •Take over-the-counter and prescription medicines only as told by your health care provider.      •If you were prescribed an antiviral medicine, take it as told by your health care provider. Do not stop taking the medicine even if you start to feel better.    •Be aware of when antibiotics are needed and when they are not needed. Antibiotics do not treat viruses. If your health care provider thinks that you may have a bacterial infection as well as a viral infection, you may get an antibiotic.  •Do not ask for an antibiotic prescription if you have been diagnosed with a viral illness. That will not make your illness go away faster.      •Frequently taking antibiotics when they are not needed can lead to antibiotic resistance. When this develops, the medicine no longer works against the bacteria that it normally fights.        General instructions     •Drink enough fluids to keep your urine clear or pale yellow.      •Rest as much as possible.      •Return to your normal activities as told by your health care provider. Ask your health care provider what activities are safe for you.      •Keep all follow-up visits as told by your health care provider. This is important.        How is this prevented?    Take these actions to reduce your risk of viral infection:  •Eat a healthy diet and get enough rest.      •Wash your hands often with soap and water. This is especially important when you are in public places. If soap and water are not available, use hand .      •Avoid close contact with friends and family who have a viral illness.      •If you travel to areas where viral gastrointestinal infection is common, avoid drinking water or eating raw food.      •Keep your immunizations up to date. Get a flu shot every year as told by your health care provider.      •Do not share toothbrushes, nail clippers, razors, or needles with other people.      •Always practice safe sex.        Contact a health care provider if:    •You have symptoms of a viral illness that do not go away.      •Your symptoms come back after going away.      •Your symptoms get worse.        Get help right away if:    •You have trouble breathing.      •You have a severe headache or a stiff neck.      •You have severe vomiting or abdominal pain.      This information is not intended to replace advice given to you by your health care provider. Make sure you discuss any questions you have with your health care provider.      Document Revised: 11/30/2018 Document Reviewed: 04/28/2017    Elsevier Patient Education © 2020 [employer]

## 2020-12-05 NOTE — ED PROVIDER NOTE - PATIENT PORTAL LINK FT
You can access the FollowMyHealth Patient Portal offered by [facility] by registering at the following website: [email or web address]. By joining [employer]’s FollowMyHealth portal, you will also be able to view your health information using other applications (apps) compatible with our system.

## 2021-01-08 ENCOUNTER — APPOINTMENT (OUTPATIENT)
Dept: VASCULAR SURGERY | Facility: CLINIC | Age: 76
End: 2021-01-08

## 2021-01-15 ENCOUNTER — APPOINTMENT (OUTPATIENT)
Dept: VASCULAR SURGERY | Facility: CLINIC | Age: 76
End: 2021-01-15
Payer: MEDICARE

## 2021-01-15 VITALS — TEMPERATURE: 96.8 F

## 2021-01-15 VITALS
DIASTOLIC BLOOD PRESSURE: 74 MMHG | WEIGHT: 195 LBS | HEART RATE: 76 BPM | HEIGHT: 67 IN | SYSTOLIC BLOOD PRESSURE: 152 MMHG | BODY MASS INDEX: 30.61 KG/M2 | OXYGEN SATURATION: 99 %

## 2021-01-15 DIAGNOSIS — I10 ESSENTIAL (PRIMARY) HYPERTENSION: ICD-10-CM

## 2021-01-15 DIAGNOSIS — I87.2 VENOUS INSUFFICIENCY (CHRONIC) (PERIPHERAL): ICD-10-CM

## 2021-01-15 DIAGNOSIS — E78.00 PURE HYPERCHOLESTEROLEMIA, UNSPECIFIED: ICD-10-CM

## 2021-01-15 PROCEDURE — 93970 EXTREMITY STUDY: CPT

## 2021-01-15 PROCEDURE — 99203 OFFICE O/P NEW LOW 30 MIN: CPT

## 2021-01-15 PROCEDURE — 99072 ADDL SUPL MATRL&STAF TM PHE: CPT

## 2021-01-15 RX ORDER — CHROMIUM 200 MCG
TABLET ORAL
Refills: 0 | Status: ACTIVE | COMMUNITY

## 2021-01-15 RX ORDER — CALCIUM CARBONATE/VITAMIN D3 600 MG-10
TABLET ORAL
Refills: 0 | Status: ACTIVE | COMMUNITY

## 2021-01-15 RX ORDER — ASCORBIC ACID 500 MG
TABLET ORAL
Refills: 0 | Status: ACTIVE | COMMUNITY

## 2021-01-15 RX ORDER — UBIQUINOL 100 MG
CAPSULE ORAL
Refills: 0 | Status: ACTIVE | COMMUNITY

## 2021-01-15 NOTE — CONSULT LETTER
[Dear  ___] : Dear  [unfilled], [Consult Letter:] : I had the pleasure of evaluating your patient, [unfilled]. [Please see my note below.] : Please see my note below. [Consult Closing:] : Thank you very much for allowing me to participate in the care of this patient.  If you have any questions, please do not hesitate to contact me. [Sincerely,] : Sincerely, [FreeTextEntry3] : Gavin Hopper M.D., F.JAYSONS., R.P.GEORGE.I.\par  of Vascular Surgery\par Assistant Professor of Radiology\par Director of Endovascular Program/ Vascular Access Center\par [facility]

## 2021-01-15 NOTE — PHYSICAL EXAM
[JVD] : no jugular venous distention  [Normal Breath Sounds] : Normal breath sounds [Normal Heart Sounds] : normal heart sounds [2+] : left 2+ [Ankle Swelling (On Exam)] : not present [Varicose Veins Of Lower Extremities] : bilaterally [] : bilaterally [Ankle Swelling On The Right] : mild [Abdomen Masses] : No abdominal masses [Skin Ulcer] : no ulcer [Alert] : alert [Oriented to Person] : oriented to person [Oriented to Place] : oriented to place

## 2021-01-15 NOTE — ASSESSMENT
[FreeTextEntry1] : Patient with bilateral lower extremity mild to moderate venous insufficiency.  Recommend compression stockings and elevation.  Follow-up in 3 months.

## 2021-01-15 NOTE — HISTORY OF PRESENT ILLNESS
[FreeTextEntry1] : Patient is a 75-year-old female with no significant past medical history presenting to us for evaluation of bilateral lower extremity varicosities.  Patient has had varicose veins for the past number of years and wears compression stockings on a regular basis.  No complaint of rest pain or claudication.  No history of smoking.  No significant cardiac disease.  No diabetes.  Patient also reports itchiness especially in the right posterior calf.

## 2021-07-21 ENCOUNTER — EMERGENCY (EMERGENCY)
Facility: HOSPITAL | Age: 76
LOS: 1 days | Discharge: HOME | End: 2021-07-21
Attending: EMERGENCY MEDICINE
Payer: COMMERCIAL

## 2021-07-21 VITALS
HEART RATE: 67 BPM | SYSTOLIC BLOOD PRESSURE: 150 MMHG | TEMPERATURE: 98 F | WEIGHT: 197.98 LBS | RESPIRATION RATE: 16 BRPM | DIASTOLIC BLOOD PRESSURE: 80 MMHG | HEIGHT: 67 IN | OXYGEN SATURATION: 98 %

## 2021-07-21 PROCEDURE — 99282 EMERGENCY DEPT VISIT SF MDM: CPT

## 2021-07-21 PROCEDURE — 99283 EMERGENCY DEPT VISIT LOW MDM: CPT

## 2021-07-21 NOTE — ED PROVIDER NOTE - HIV OFFER
Previously Declined (within the last year) How Severe Are Your Spot(S)?: mild What Type Of Note Output Would You Prefer (Optional)?: Standard Output What Is The Reason For Today's Visit?: Full Body Skin Examination What Is The Reason For Today's Visit? (Being Monitored For X): the development of new lesions

## 2021-07-21 NOTE — ED PROVIDER NOTE - PATIENT PORTAL LINK FT
You can access the FollowMyHealth Patient Portal offered by [facility] by registering at the following website: [email or web address]. By joining [employer]’s FollowMyHealth portal, you will also be able to view your health information using other applications (apps) compatible with our system.

## 2021-07-21 NOTE — ED PROVIDER NOTE - NSFOLLOWUPINSTRUCTIONS_ED_ALL_ED_FT
Return to ED immediately if you notice red/blister spots over skin/face.  Followup with PMD for reevaluation.

## 2021-07-21 NOTE — ED ADULT NURSE NOTE - OBJECTIVE STATEMENT
the patient is a 76 yr female  complaining of itching in the  forehead   thought that she has schegells

## 2021-07-21 NOTE — ED PROVIDER NOTE - OBJECTIVE STATEMENT
75yo F with hx HTN presents with concern she might have shingles. Patient reports 3 days ago she had onset of right shoulder/upper back pain radiating up her neck and head diffusely. seen by PMD who thought it was due to muscular pain. Reports symptoms improved but last night developed itching over right forehead thus was concerned she might have singles. Denies current headache, eye pain or blurry vision. Denies hx shingles but reports she did have chicken pox in remote past, denies shingles vaccination.

## 2021-07-21 NOTE — ED PROVIDER NOTE - CLINICAL SUMMARY MEDICAL DECISION MAKING FREE TEXT BOX
75yo F with hx HTN presents with concern she might have shingles. Exam shows 1cm area of erythema, no vesicle/bullae on exam. Patient stable for discharge with careful return to ED precautions.

## 2021-09-22 ENCOUNTER — EMERGENCY (EMERGENCY)
Facility: HOSPITAL | Age: 76
LOS: 1 days | Discharge: HOME | End: 2021-09-22
Attending: STUDENT IN AN ORGANIZED HEALTH CARE EDUCATION/TRAINING PROGRAM
Payer: COMMERCIAL

## 2021-09-22 VITALS
DIASTOLIC BLOOD PRESSURE: 81 MMHG | RESPIRATION RATE: 18 BRPM | OXYGEN SATURATION: 98 % | HEART RATE: 81 BPM | WEIGHT: 202.83 LBS | SYSTOLIC BLOOD PRESSURE: 171 MMHG | TEMPERATURE: 98 F | HEIGHT: 67 IN

## 2021-09-22 PROCEDURE — 99285 EMERGENCY DEPT VISIT HI MDM: CPT | Mod: CS

## 2021-09-23 LAB
ALBUMIN SERPL ELPH-MCNC: 3.4 G/DL — LOW (ref 3.5–5)
ALP SERPL-CCNC: 70 U/L — SIGNIFICANT CHANGE UP (ref 40–120)
ALT FLD-CCNC: 25 U/L DA — SIGNIFICANT CHANGE UP (ref 10–60)
ANION GAP SERPL CALC-SCNC: 10 MMOL/L — SIGNIFICANT CHANGE UP (ref 5–17)
APTT BLD: 32.8 SEC — SIGNIFICANT CHANGE UP (ref 27.5–35.5)
AST SERPL-CCNC: 17 U/L — SIGNIFICANT CHANGE UP (ref 10–40)
BASOPHILS # BLD AUTO: 0.04 K/UL — SIGNIFICANT CHANGE UP (ref 0–0.2)
BASOPHILS NFR BLD AUTO: 0.4 % — SIGNIFICANT CHANGE UP (ref 0–2)
BILIRUB SERPL-MCNC: 0.4 MG/DL — SIGNIFICANT CHANGE UP (ref 0.2–1.2)
BUN SERPL-MCNC: 13 MG/DL — SIGNIFICANT CHANGE UP (ref 7–18)
CALCIUM SERPL-MCNC: 9.4 MG/DL — SIGNIFICANT CHANGE UP (ref 8.4–10.5)
CHLORIDE SERPL-SCNC: 101 MMOL/L — SIGNIFICANT CHANGE UP (ref 96–108)
CO2 SERPL-SCNC: 29 MMOL/L — SIGNIFICANT CHANGE UP (ref 22–31)
CREAT SERPL-MCNC: 0.7 MG/DL — SIGNIFICANT CHANGE UP (ref 0.5–1.3)
D DIMER BLD IA.RAPID-MCNC: 305 NG/ML DDU — HIGH
EOSINOPHIL # BLD AUTO: 0.09 K/UL — SIGNIFICANT CHANGE UP (ref 0–0.5)
EOSINOPHIL NFR BLD AUTO: 1 % — SIGNIFICANT CHANGE UP (ref 0–6)
GLUCOSE SERPL-MCNC: 110 MG/DL — HIGH (ref 70–99)
HCT VFR BLD CALC: 39.1 % — SIGNIFICANT CHANGE UP (ref 34.5–45)
HGB BLD-MCNC: 12.3 G/DL — SIGNIFICANT CHANGE UP (ref 11.5–15.5)
IMM GRANULOCYTES NFR BLD AUTO: 0.3 % — SIGNIFICANT CHANGE UP (ref 0–1.5)
INR BLD: 1.02 RATIO — SIGNIFICANT CHANGE UP (ref 0.88–1.16)
LYMPHOCYTES # BLD AUTO: 1.54 K/UL — SIGNIFICANT CHANGE UP (ref 1–3.3)
LYMPHOCYTES # BLD AUTO: 16.6 % — SIGNIFICANT CHANGE UP (ref 13–44)
MCHC RBC-ENTMCNC: 26.6 PG — LOW (ref 27–34)
MCHC RBC-ENTMCNC: 31.5 GM/DL — LOW (ref 32–36)
MCV RBC AUTO: 84.6 FL — SIGNIFICANT CHANGE UP (ref 80–100)
MONOCYTES # BLD AUTO: 0.73 K/UL — SIGNIFICANT CHANGE UP (ref 0–0.9)
MONOCYTES NFR BLD AUTO: 7.9 % — SIGNIFICANT CHANGE UP (ref 2–14)
NEUTROPHILS # BLD AUTO: 6.83 K/UL — SIGNIFICANT CHANGE UP (ref 1.8–7.4)
NEUTROPHILS NFR BLD AUTO: 73.8 % — SIGNIFICANT CHANGE UP (ref 43–77)
NRBC # BLD: 0 /100 WBCS — SIGNIFICANT CHANGE UP (ref 0–0)
NT-PROBNP SERPL-SCNC: 72 PG/ML — SIGNIFICANT CHANGE UP (ref 0–450)
PLATELET # BLD AUTO: 260 K/UL — SIGNIFICANT CHANGE UP (ref 150–400)
POTASSIUM SERPL-MCNC: 3.5 MMOL/L — SIGNIFICANT CHANGE UP (ref 3.5–5.3)
POTASSIUM SERPL-SCNC: 3.5 MMOL/L — SIGNIFICANT CHANGE UP (ref 3.5–5.3)
PROT SERPL-MCNC: 7.8 G/DL — SIGNIFICANT CHANGE UP (ref 6–8.3)
PROTHROM AB SERPL-ACNC: 12.1 SEC — SIGNIFICANT CHANGE UP (ref 10.6–13.6)
RBC # BLD: 4.62 M/UL — SIGNIFICANT CHANGE UP (ref 3.8–5.2)
RBC # FLD: 14.2 % — SIGNIFICANT CHANGE UP (ref 10.3–14.5)
SARS-COV-2 RNA SPEC QL NAA+PROBE: SIGNIFICANT CHANGE UP
SODIUM SERPL-SCNC: 140 MMOL/L — SIGNIFICANT CHANGE UP (ref 135–145)
TROPONIN I SERPL-MCNC: <0.015 NG/ML — SIGNIFICANT CHANGE UP (ref 0–0.04)
TROPONIN I SERPL-MCNC: <0.015 NG/ML — SIGNIFICANT CHANGE UP (ref 0–0.04)
WBC # BLD: 9.26 K/UL — SIGNIFICANT CHANGE UP (ref 3.8–10.5)
WBC # FLD AUTO: 9.26 K/UL — SIGNIFICANT CHANGE UP (ref 3.8–10.5)

## 2021-09-23 PROCEDURE — 80053 COMPREHEN METABOLIC PANEL: CPT

## 2021-09-23 PROCEDURE — 85379 FIBRIN DEGRADATION QUANT: CPT

## 2021-09-23 PROCEDURE — 99284 EMERGENCY DEPT VISIT MOD MDM: CPT | Mod: 25

## 2021-09-23 PROCEDURE — 71045 X-RAY EXAM CHEST 1 VIEW: CPT

## 2021-09-23 PROCEDURE — 85610 PROTHROMBIN TIME: CPT

## 2021-09-23 PROCEDURE — 84484 ASSAY OF TROPONIN QUANT: CPT

## 2021-09-23 PROCEDURE — 85025 COMPLETE CBC W/AUTO DIFF WBC: CPT

## 2021-09-23 PROCEDURE — 85730 THROMBOPLASTIN TIME PARTIAL: CPT

## 2021-09-23 PROCEDURE — 93010 ELECTROCARDIOGRAM REPORT: CPT

## 2021-09-23 PROCEDURE — 87635 SARS-COV-2 COVID-19 AMP PRB: CPT

## 2021-09-23 PROCEDURE — 71045 X-RAY EXAM CHEST 1 VIEW: CPT | Mod: 26

## 2021-09-23 PROCEDURE — 93005 ELECTROCARDIOGRAM TRACING: CPT

## 2021-09-23 PROCEDURE — 83880 ASSAY OF NATRIURETIC PEPTIDE: CPT

## 2021-09-23 PROCEDURE — 36415 COLL VENOUS BLD VENIPUNCTURE: CPT

## 2021-09-23 RX ORDER — SODIUM CHLORIDE 9 MG/ML
3 INJECTION INTRAMUSCULAR; INTRAVENOUS; SUBCUTANEOUS EVERY 8 HOURS
Refills: 0 | Status: DISCONTINUED | OUTPATIENT
Start: 2021-09-23 | End: 2021-09-26

## 2021-09-23 NOTE — ED PROVIDER NOTE - ATTESTATION, MLM
Addendum  created 11/06/18 0815 by Sparkle Mcclain, DO    Anesthesia Event edited
I have reviewed and confirmed nurses' notes for patient's medications, allergies, medical history, and surgical history.

## 2021-09-23 NOTE — ED PROVIDER NOTE - PHYSICAL EXAMINATION
Vital Signs Reviewed  GEN: Comfortable, NAD, AAOx3  HEENT: NCAT, MMM, Neck Supple  RESP: CTAB, No rales/rhonchi/wheezing  CV: RRR, S1S2, No murmurs  ABD: No TTP, Soft, ND, No masses, No CVA Tenderness  Extrem/Skin: Equal pulses bilat, No cyanosis/edema/rashes  Neuro: No focal deficits Vital Signs Reviewed  GEN: Comfortable, NAD, AAOx3  HEENT: NCAT, MMM, Neck Supple  RESP: CTAB, No rales/rhonchi/wheezing, Nml RR and WOB  CV: RRR, S1S2, No murmurs  ABD: No TTP, Soft, ND, No masses, No CVA Tenderness  Extrem/Skin: Equal pulses bilat, No cyanosis/edema/rashes  Neuro: No focal deficits

## 2021-09-23 NOTE — ED PROVIDER NOTE - PATIENT PORTAL LINK FT
You can access the FollowMyHealth Patient Portal offered by [facility] by registering at the following website: [email or web address]. By joining [employer]’s FollowMyHealth portal, you will also be able to view your health information using other applications (apps) compatible with our system.

## 2021-09-23 NOTE — ED PROVIDER NOTE - CLINICAL SUMMARY MEDICAL DECISION MAKING FREE TEXT BOX
Patient presenting with 1 day of shortness of breath. EKG and CXR normal. Labs pending. Patient stable and will reassess. Patient presenting with 1 day of shortness of breath. CXR normal. Labs pending. Patient stable and will reassess. EKG showing new TWIs in leads 1 and 2.    Trops neg x 2. Dimer 305. D/w pt obtaining a CTA for r/o PE. Pt states that she is nervous about CTA and if age adjusted her dimer cutoff is 375. Pt initially agreed to CTA and now refusing and will monitor her symptoms at home. Rec cards f/u within 3 days given new TWIs although pt denies any chest pain or dizziness/syncope. Discussed with pt my clinical impression and results and the risk of leaving without a CTA. Patient given strict return precautions if persistent or worsening of symptoms occurs, and need for close follow up. Pt expressed understanding and agrees with plan. Pt is well appearing with a reassuring exam. Discharge home with PMD or Specialist f/u within 3 days.

## 2021-09-23 NOTE — ED PROVIDER NOTE - NSFOLLOWUPINSTRUCTIONS_ED_ALL_ED_FT
You were seen in the emergency room today for shortness of breath. Please obtain the next available appointment with the Cardiology doctors. Please remember our talk about symptoms of pulmonary embolism and return to the ER if you have any more concern.    Please call your primary doctor to inform them of this ER visit and obtain the next available appointment within the next 3 days. As we discussed, return to the ER if you have any worsening symptoms.    We no longer feel that you need further emergency care or admission to the hospital at this time.    While we have determined that you are currently stable for discharge, we know that things can change. Please seek immediate medical attention or return to the ER if you experience any of the following:  Any worsening or persistent symptoms  Severe Pain  Chest Pain  Difficulty Breathing  Bleeding  Passing Out  Severe Rash  Inability to Eat or Drink  Persistent Fever    Please see a primary care doctor or specialist within 5 days to ensure that you are improving.    Please call the [employer] phone numbers on this document if you have any problems obtaining a follow up appointment.    I wish you well! -Dr Evans

## 2021-09-23 NOTE — ED PROVIDER NOTE - OBJECTIVE STATEMENT
77 y/o female with hx of HTN on atenolol, presenting with 1 day of shortness of breath. Pt states she was walking up a flight of a stairs after buying some groceries when she felt some shortness of breath after getting to the top of the stairs and the shortness of breath persisted all day. In ED pt states she still has some shortness of breath. Pt denies any history of heart problems, blood clots, or leg swelling. Pt denies any recent fever, cough, nausea, vomiting, diarrhea, urinary symptoms, syncope, focal numbness/weakness, or any other recent illnesses and hospitalizations. 75 y/o female with hx of HTN on atenolol, presenting with 1 day of shortness of breath. Pt states she was walking up a flight of a stairs after buying some groceries when she felt some shortness of breath after getting to the top of the stairs and the shortness of breath persisted for most of the day. In ED pt states she still has some shortness of breath and denies any other symptoms or pain. Pt denies any history of heart problems, blood clots, or leg swelling. Pt denies any recent fever, cough, nausea, vomiting, diarrhea, urinary symptoms, syncope, focal numbness/weakness, or any other recent illnesses and hospitalizations.

## 2021-09-23 NOTE — ED PROVIDER NOTE - NSFOLLOWUPCLINICS_GEN_ALL_ED_FT
[facility]  Cardiology  [address] [place] [address]  Phone: [phone]  Fax:   Follow Up Time: 1-3 Days

## 2021-09-24 ENCOUNTER — EMERGENCY (EMERGENCY)
Facility: HOSPITAL | Age: 76
LOS: 1 days | Discharge: HOME | End: 2021-09-24
Attending: STUDENT IN AN ORGANIZED HEALTH CARE EDUCATION/TRAINING PROGRAM
Payer: COMMERCIAL

## 2021-09-24 VITALS
HEIGHT: 67 IN | HEART RATE: 80 BPM | OXYGEN SATURATION: 99 % | SYSTOLIC BLOOD PRESSURE: 160 MMHG | DIASTOLIC BLOOD PRESSURE: 72 MMHG | WEIGHT: 192.9 LBS | TEMPERATURE: 99 F | RESPIRATION RATE: 17 BRPM

## 2021-09-24 VITALS
DIASTOLIC BLOOD PRESSURE: 74 MMHG | TEMPERATURE: 98 F | HEART RATE: 79 BPM | OXYGEN SATURATION: 98 % | RESPIRATION RATE: 18 BRPM | SYSTOLIC BLOOD PRESSURE: 142 MMHG

## 2021-09-24 LAB
ALBUMIN SERPL ELPH-MCNC: 3.7 G/DL — SIGNIFICANT CHANGE UP (ref 3.5–5)
ALP SERPL-CCNC: 72 U/L — SIGNIFICANT CHANGE UP (ref 40–120)
ALT FLD-CCNC: 23 U/L DA — SIGNIFICANT CHANGE UP (ref 10–60)
ANION GAP SERPL CALC-SCNC: 7 MMOL/L — SIGNIFICANT CHANGE UP (ref 5–17)
APTT BLD: 34 SEC — SIGNIFICANT CHANGE UP (ref 27.5–35.5)
AST SERPL-CCNC: 15 U/L — SIGNIFICANT CHANGE UP (ref 10–40)
BASOPHILS # BLD AUTO: 0.03 K/UL — SIGNIFICANT CHANGE UP (ref 0–0.2)
BASOPHILS NFR BLD AUTO: 0.3 % — SIGNIFICANT CHANGE UP (ref 0–2)
BILIRUB SERPL-MCNC: 0.7 MG/DL — SIGNIFICANT CHANGE UP (ref 0.2–1.2)
BUN SERPL-MCNC: 12 MG/DL — SIGNIFICANT CHANGE UP (ref 7–18)
CALCIUM SERPL-MCNC: 9.7 MG/DL — SIGNIFICANT CHANGE UP (ref 8.4–10.5)
CHLORIDE SERPL-SCNC: 101 MMOL/L — SIGNIFICANT CHANGE UP (ref 96–108)
CO2 SERPL-SCNC: 31 MMOL/L — SIGNIFICANT CHANGE UP (ref 22–31)
CREAT SERPL-MCNC: 0.88 MG/DL — SIGNIFICANT CHANGE UP (ref 0.5–1.3)
EOSINOPHIL # BLD AUTO: 0.01 K/UL — SIGNIFICANT CHANGE UP (ref 0–0.5)
EOSINOPHIL NFR BLD AUTO: 0.1 % — SIGNIFICANT CHANGE UP (ref 0–6)
GLUCOSE SERPL-MCNC: 87 MG/DL — SIGNIFICANT CHANGE UP (ref 70–99)
HCT VFR BLD CALC: 40.7 % — SIGNIFICANT CHANGE UP (ref 34.5–45)
HGB BLD-MCNC: 13.1 G/DL — SIGNIFICANT CHANGE UP (ref 11.5–15.5)
IMM GRANULOCYTES NFR BLD AUTO: 0.4 % — SIGNIFICANT CHANGE UP (ref 0–1.5)
INR BLD: 1.12 RATIO — SIGNIFICANT CHANGE UP (ref 0.88–1.16)
LYMPHOCYTES # BLD AUTO: 1.18 K/UL — SIGNIFICANT CHANGE UP (ref 1–3.3)
LYMPHOCYTES # BLD AUTO: 12.3 % — LOW (ref 13–44)
MCHC RBC-ENTMCNC: 27 PG — SIGNIFICANT CHANGE UP (ref 27–34)
MCHC RBC-ENTMCNC: 32.2 GM/DL — SIGNIFICANT CHANGE UP (ref 32–36)
MCV RBC AUTO: 83.7 FL — SIGNIFICANT CHANGE UP (ref 80–100)
MONOCYTES # BLD AUTO: 0.59 K/UL — SIGNIFICANT CHANGE UP (ref 0–0.9)
MONOCYTES NFR BLD AUTO: 6.2 % — SIGNIFICANT CHANGE UP (ref 2–14)
NEUTROPHILS # BLD AUTO: 7.73 K/UL — HIGH (ref 1.8–7.4)
NEUTROPHILS NFR BLD AUTO: 80.7 % — HIGH (ref 43–77)
NRBC # BLD: 0 /100 WBCS — SIGNIFICANT CHANGE UP (ref 0–0)
PLATELET # BLD AUTO: 260 K/UL — SIGNIFICANT CHANGE UP (ref 150–400)
POTASSIUM SERPL-MCNC: 3 MMOL/L — LOW (ref 3.5–5.3)
POTASSIUM SERPL-SCNC: 3 MMOL/L — LOW (ref 3.5–5.3)
PROT SERPL-MCNC: 8.3 G/DL — SIGNIFICANT CHANGE UP (ref 6–8.3)
PROTHROM AB SERPL-ACNC: 13.2 SEC — SIGNIFICANT CHANGE UP (ref 10.6–13.6)
RBC # BLD: 4.86 M/UL — SIGNIFICANT CHANGE UP (ref 3.8–5.2)
RBC # FLD: 13.9 % — SIGNIFICANT CHANGE UP (ref 10.3–14.5)
SODIUM SERPL-SCNC: 139 MMOL/L — SIGNIFICANT CHANGE UP (ref 135–145)
TROPONIN I SERPL-MCNC: <0.015 NG/ML — SIGNIFICANT CHANGE UP (ref 0–0.04)
WBC # BLD: 9.58 K/UL — SIGNIFICANT CHANGE UP (ref 3.8–10.5)
WBC # FLD AUTO: 9.58 K/UL — SIGNIFICANT CHANGE UP (ref 3.8–10.5)

## 2021-09-24 PROCEDURE — 71275 CT ANGIOGRAPHY CHEST: CPT | Mod: MA

## 2021-09-24 PROCEDURE — 85610 PROTHROMBIN TIME: CPT

## 2021-09-24 PROCEDURE — 99285 EMERGENCY DEPT VISIT HI MDM: CPT

## 2021-09-24 PROCEDURE — 93005 ELECTROCARDIOGRAM TRACING: CPT

## 2021-09-24 PROCEDURE — 85025 COMPLETE CBC W/AUTO DIFF WBC: CPT

## 2021-09-24 PROCEDURE — 99284 EMERGENCY DEPT VISIT MOD MDM: CPT | Mod: 25

## 2021-09-24 PROCEDURE — 36415 COLL VENOUS BLD VENIPUNCTURE: CPT

## 2021-09-24 PROCEDURE — 84484 ASSAY OF TROPONIN QUANT: CPT

## 2021-09-24 PROCEDURE — 80053 COMPREHEN METABOLIC PANEL: CPT

## 2021-09-24 PROCEDURE — 71275 CT ANGIOGRAPHY CHEST: CPT | Mod: 26,MA

## 2021-09-24 PROCEDURE — 85730 THROMBOPLASTIN TIME PARTIAL: CPT

## 2021-09-24 NOTE — ED PROVIDER NOTE - PROGRESS NOTE DETAILS
Patient ct negative for acute finding., thyroid finding endorsed to patient to f.u pmd. patient endorses feeling well. state pmd will refer her for card. given return precaution and instructed to f.u pmd

## 2021-09-24 NOTE — ED PROVIDER NOTE - PATIENT PORTAL LINK FT
You can access the FollowMyHealth Patient Portal offered by [facility] by registering at the following website: [email or web address]. By joining [employer]’s FollowMyHealth portal, you will also be able to view your health information using other applications (apps) compatible with our system.

## 2021-09-24 NOTE — ED PROVIDER NOTE - OBJECTIVE STATEMENT
76 y.o presenting with sob x 3 days with exertion. endorses that she was told to come back for ct chest if symptoms persist. denies n, v, abd pain, focal weakness, numbness, calf pain/swelling

## 2021-09-24 NOTE — ED PROVIDER NOTE - CLINICAL SUMMARY MEDICAL DECISION MAKING FREE TEXT BOX
Patient presenting with sob, vital stable. will obtain lab, ct, r.o pe, infection, acs, ed obs and reassess

## 2021-09-24 NOTE — ED ADULT NURSE NOTE - OBJECTIVE STATEMENT
Pt was seen here 2 days ago for shortness of breath and discharged home,   Today requesting CTA chest, symptoms still present

## 2021-09-25 PROBLEM — I10 ESSENTIAL (PRIMARY) HYPERTENSION: Chronic | Status: ACTIVE | Noted: 2021-09-23

## 2021-11-05 ENCOUNTER — APPOINTMENT (OUTPATIENT)
Dept: ORTHOPEDIC SURGERY | Facility: CLINIC | Age: 76
End: 2021-11-05
Payer: MEDICARE

## 2021-11-05 VITALS
HEIGHT: 67 IN | DIASTOLIC BLOOD PRESSURE: 70 MMHG | BODY MASS INDEX: 29.82 KG/M2 | HEART RATE: 67 BPM | OXYGEN SATURATION: 98 % | SYSTOLIC BLOOD PRESSURE: 144 MMHG | WEIGHT: 190 LBS

## 2021-11-05 DIAGNOSIS — M17.0 BILATERAL PRIMARY OSTEOARTHRITIS OF KNEE: ICD-10-CM

## 2021-11-05 PROCEDURE — 20611 DRAIN/INJ JOINT/BURSA W/US: CPT | Mod: 59,RT

## 2021-11-05 PROCEDURE — 99214 OFFICE O/P EST MOD 30 MIN: CPT | Mod: 25

## 2021-11-05 PROCEDURE — 73564 X-RAY EXAM KNEE 4 OR MORE: CPT | Mod: RT

## 2021-11-05 NOTE — PHYSICAL EXAM
[de-identified] : Physical Examination\par General: well nourished, in no acute distress, alert and oriented to person, place and time\par Psychiatric: normal mood and affect, no abnormal movements or speech patterns\par Eyes: vision intact - glasses\par \par Musculoskeletal Examination\par Ambulation	+ antalgic gait, - assistive devices\par \par Knee			Right			Left\par General\par      Swelling/Deformity	normal			normal	\par      Skin			normal			normal\par      Erythema		-			-\par      Standing Alignment	neutral			neutral\par      Effusion		trace			trace\par Range of Motion\par      Hip			full painless ROM		full painless ROM\par      Knee Flexion		100			120\par      Knee Extension	0			0\par Patella\par      J Sign		-			-\par      Quad Medial/Lateral	1/1 1/1\par      Apprehension		-			-\par      Alejandro's		+			+\par      Grind Sign		+			+\par      Crepitus		+			+\par Palpation\par      Medial Joint Line	-			-\par      Medial Fem Condyle	-			-\par      Lateral Joint Line	+			+\par      Quad Tendon		-			-\par      Patella Tendon	-			-\par      Medial Patella		-			-\par      Lateral Patella 	-			-\par      Posterior Knee	-			-\par Ligamentous\par      Varus @ 0° / 30°	-/-			-/-\par      Valgus @ 0° / 30°	-/-			-/-\par      Lachman		-			-\par      Pivot Shift		-			-\par      Anterior Drawer	-			-\par      Posterior Drawer	-			-\par Meniscus\par      Jeannie		=/+ lateral			-\par      Flexion Pinch		-			-\par Strength Examination/Atrophy\par      Hip Flexors 		5+			5+\par      Quadriceps		5+			5+\par      Hamstring		5+			5+\par      Tibialis Anterior	5+			5+\par      Achilles/Soleus	5+			5+\par Sensation\par      Deep Peroneal	normal			normal\par      Superficial Peroneal 	normal			normal\par      Sural  		normal			normal\par      Posterior Tibial 	normal			normal\par      Saphneous 		normal			normal\par Pulses\par      DP			2+			2+\par  [de-identified] : 5 views of the affected Right knee (standing AP, flexing standing AP, 30degree flexed lateral, 0degree lateral, sunrise view)\par 7-31-19\par demonstrate:\par There is mild to moderate lateral narrowing\par Trace to small osteophytic lipping\par Trace suprapatellar effusion\par Osteophytes with mild patellofemoral joint space loss without evidence of tilt [or] subluxation on sunrise view\par Normal soft tissue density\par Otherwise normal osseous bone structure without fracture or dislocation\par \par 5 views of the affected Right knee (standing AP, flexing standing AP, 30degree flexed lateral, 0degree lateral, sunrise view)\par 9-\par demonstrate:\par There is moderate lateral narrowing\par Trace to small osteophytic lipping\par Small suprapatellar effusion\par Osteophytes with mild patellofemoral joint space loss without evidence of tilt [or] subluxation on sunrise view\par Normal soft tissue density\par Otherwise normal osseous bone structure without fracture or dislocation\par \par 4 views of the affected bilateral knee (standing AP, flexing standing AP, 30degree flexed lateral, sunrise view)\par were ordered, obtained and evaluated by myself today and\par demonstrate:\par \par RIGHT\par There is severe flexed knee lateral narrowing\par Trace to small osteophytic lipping\par Small suprapatellar effusion\par Osteophytes with mild patellofemoral joint space loss without evidence of tilt [or] subluxation on sunrise view\par Normal soft tissue density\par Otherwise normal osseous bone structure without fracture or dislocation\par \par LEFT\par There is mild flexed knee lateral narrowing\par Trace to small osteophytic lipping\par Small suprapatellar effusion\par Mild patellofemoral joint space loss without evidence of tilt [or] subluxation on sunrise view\par Normal soft tissue density\par Otherwise normal osseous bone structure without fracture or dislocation

## 2021-11-05 NOTE — DISCUSSION/SUMMARY
[de-identified] : Right knee osteoarthritisa predominantly in the patellofemoral and lateral compartments\par Possible lateral compartment internal derangement\par Left knee osteoarthritis\par \par The patient and I discussed the causes and progression of degenerative joint disease of the knee. Models, diagrams and drawings were used in the discussion. Treatment can include conservative non-operative management and surgical options. Conservative management includes weight loss, activity modification, physical therapy to improve motion and strength in the muscles around the knee and the body's core, PO and topical NSAIDs, corticosteroid and/or viscosupplementation intra-articular injections. If the patient fails to improve with non-operative management, surgical management is possible. Depending upon the patient's age, BMI, activity level, degree and location of arthrosis different surgical options are possible including arthroscopic debridement with chondroplasty, high-tibial osteotomy, unicondylar/partial arthroplasty, and total joint arthroplasty.\par \par Physical therapy was prescribed for knee ROM exercises, strengthening exercises, deep tissue massage, core strengthening, hip abductor strengthening, VMO strengthening, modalities PRN, and home exercises\par \par Procedure Note:\par \par Verbal consent was obtained for an arthrocentesis and intra-articular corticosteroid injection of the LEFT and RIGHT knee, after the risks and benefits were discussed with the patient. Potential adverse effects were discussed including but not limited to bleeding, skin/joint infection, local skin reactions including bleaching, bruising, stiffness, soreness, vasovagal episodes, transient hyperglycemia, avascular necrosis, pseudo-septic type reactions, post injection joint pain, allergic reaction to product or anesthetic and other rare but potential adverse effects along with benefits including decreased pain and improved stability prior to obtaining verbal informed consent. It was also discussed that for some patients the treatment is ineffective and there are no guarantees that the patient will experience improvement as the result of the injection. In rare occasions the injection can cause worsening of pain.\par \par The use of a Sonosite 15-6 MHz linear transducer with live ultrasound guidance of the knee was necessary given the patient's BMI and local body habitus overlying and obscuring the accurate identification of normal body bony anatomy used to identify the injection site and the depth of soft tissue envelope necessitating a longer than normal needle to reach the joint space, and to confirm the location of the needle tip and intra-articular delivery of the medication. Without the use of live ultrasound guidance the injection would have been more difficult and place the patient's neurovascular structures at risk from the longer needle needed to traverse the soft tissue envelope.\par \par A 6 inch bolster was placed underneath the BILATERAL knee to place the knee in a slightly flexed position.\par \par The LEFT knee superolateral injection site was identified using the ultrasound probe to identify the suprapatellar space and superior boarder of the patella first longitudinally and then transversely. The injection site was marked and prepped with a ChloraPrep swab and anesthetized with ethylchloride skin anesthesia. Using sterile technique a 20g 3 1/2 in spinal needle with 3 cc total of 1cc 1% lidocaine without epinephrine, 1cc 0.25% Marcaine without epinephrine and 1cc of 40mg/mL Kenalog was passed through the injection site towards the suprapatellar space under live ultrasound guidance and noted to penetrate the joint capsule. The medication was injected without resistance under live ultrasound visualization and noted to flow into the suprapatellar joint space. The injection site was sterilely dressed, there was minimal blood loss.\par \par The RIGHT knee superolateral injection site was identified using the ultrasound probe to identify the suprapatellar space and superior boarder of the patella first longitudinally and then transversely. The injection site was marked and prepped with a ChloraPrep swab and anesthetized with ethylchloride skin anesthesia. Using sterile technique a 20g 3 1/2 in spinal needle with 3 cc total of 1cc 1% lidocaine without epinephrine, 1cc 0.25% Marcaine without epinephrine and 1cc of 40mg/mL Kenalog was passed through the injection site towards the suprapatellar space under live ultrasound guidance and noted to penetrate the joint capsule. The medication was injected without resistance under live ultrasound visualization and noted to flow into the suprapatellar joint space. The injection site was sterilely dressed, there was minimal blood loss.\par \par The patient tolerated this procedure without any complications done by myself. Images were recorded and saved.\par \par The patient has been advised that if they notice any worsening of symptoms or any problems to contact me and seek care from a qualified medical professional. The patient was instructed to ice the knee and take NSAID medication on an as needed basis if the patient feels discomfort.\par \par \par \par \par \par The patient was prescribed Diclofenac topical liquid/creme non-steroidal anti-inflammatory medication. 1-2 pumps twice daily and apply to area with pain. There is low systemic absorption of the medication but risks while reduced remain were discussed and include but not limited to renal damage and GI ulceration and bleeding. They were warned to stop the medication if worsening buring skin or gastric pain or dizziness or other side effects. Also to immediately stop the medication and seek appropriate medical attention if any severe stomach ache, gastritis, black/red vomit, black/red stools or any other medical concern.\par \par all questions answered\par The patient verifies their understanding the the visit, diagnosis and plan. They agree with the treatment plan and will contact the office with any questions or problems.\par \par Follow up\par PRN

## 2022-01-11 ENCOUNTER — APPOINTMENT (OUTPATIENT)
Dept: ORTHOPEDIC SURGERY | Facility: CLINIC | Age: 77
End: 2022-01-11

## 2022-01-31 NOTE — ED ADULT NURSE NOTE - SUICIDE SCREENING DEPRESSION
Patient called into [facility] outpatient department stating he needs his PICC line looked at.  Noted in home health RN note from yesterday that neither port could be flushed.  Explained that TPA cannot be used if it wouldn't even be able to be flushed into lumens of the PICC at this point.  Stated he may need PICC exchange performed.  Instructed patient to call Dr. Fofana's office to get direction/orders as to what needs to be done or that home health RN could contact them to explain what has been going on with PICC.  Told patient that if he needs his home abx through a peripheral IV temporarily at [facility], that this would be a possibility, but order would need to be given by MD.  Patient verbalized understanding.  
Negative

## 2022-05-18 NOTE — ED ADULT TRIAGE NOTE - HEART RATE (BEATS/MIN)
68 PAST SURGICAL HISTORY:  No significant past surgical history      (ventriculoperitoneal) shunt status

## 2022-08-16 ENCOUNTER — EMERGENCY (EMERGENCY)
Facility: HOSPITAL | Age: 77
LOS: 1 days | Discharge: HOME | End: 2022-08-16
Attending: EMERGENCY MEDICINE
Payer: COMMERCIAL

## 2022-08-16 VITALS
SYSTOLIC BLOOD PRESSURE: 154 MMHG | DIASTOLIC BLOOD PRESSURE: 74 MMHG | WEIGHT: 190.04 LBS | HEART RATE: 78 BPM | HEIGHT: 67 IN | TEMPERATURE: 100 F | OXYGEN SATURATION: 99 % | RESPIRATION RATE: 16 BRPM

## 2022-08-16 DIAGNOSIS — D24.9 BENIGN NEOPLASM OF UNSPECIFIED BREAST: Chronic | ICD-10-CM

## 2022-08-16 LAB — SARS-COV-2 RNA SPEC QL NAA+PROBE: DETECTED

## 2022-08-16 PROCEDURE — 87635 SARS-COV-2 COVID-19 AMP PRB: CPT

## 2022-08-16 PROCEDURE — 71045 X-RAY EXAM CHEST 1 VIEW: CPT

## 2022-08-16 PROCEDURE — 71045 X-RAY EXAM CHEST 1 VIEW: CPT | Mod: 26

## 2022-08-16 PROCEDURE — 99284 EMERGENCY DEPT VISIT MOD MDM: CPT

## 2022-08-16 PROCEDURE — 99285 EMERGENCY DEPT VISIT HI MDM: CPT | Mod: 25

## 2022-08-16 RX ORDER — NIRMATRELVIR AND RITONAVIR 150-100 MG
3 KIT ORAL
Qty: 30 | Refills: 0
Start: 2022-08-16 | End: 2022-08-20

## 2022-08-16 NOTE — ED ADULT NURSE NOTE - OBJECTIVE STATEMENT
pt is here for cold systems.  pt stated that cough, runny nose and chills x 2 days,  home covid test +, denied chest pain or sob,

## 2022-08-16 NOTE — ED PROVIDER NOTE - PATIENT PORTAL LINK FT
You can access the FollowMyHealth Patient Portal offered by [facility] by registering at the following website: [email or web address]. By joining [email or web address]’s FollowMyHealth portal, you will also be able to view your health information using other applications (apps) compatible with our system.

## 2022-08-16 NOTE — ED PROVIDER NOTE - CLINICAL SUMMARY MEDICAL DECISION MAKING FREE TEXT BOX
pt tested positive for COVID @ home, will repeat COVID test, also get CXR-pt requests.  Walked pt O2 sat 97-99%, HR 83

## 2022-08-16 NOTE — ED PROVIDER NOTE - OBJECTIVE STATEMENT
77 y.o. female claims she's vaccinated for COVID with 1 booster, pt went to [facility] last Mon, returned on Sat., pt c/o feeling hot & chills since Sun night, clear runny nose, yest with dry cough, fever, myalgia, no sob, diarrhea.  Pt did home COVID test-positive.  Pt with dec. appetite.  Pt took Tylenol, last dose prior to coming to ED.

## 2022-08-16 NOTE — ED ADULT NURSE NOTE - FINAL NURSING ELECTRONIC SIGNATURE
Bilateral Helical Rim Advancement Flap Text: The defect edges were debeveled with a #15 blade scalpel.  Given the location of the defect and the proximity to free margins (helical rim) a bilateral helical rim advancement flap was deemed most appropriate.  Using a sterile surgical marker, the appropriate advancement flaps were drawn incorporating the defect and placing the expected incisions between the helical rim and antihelix where possible.  The area thus outlined was incised through and through with a #15 scalpel blade.  With a skin hook and iris scissors, the flaps were gently and sharply undermined and freed up. 16-Aug-2022 16:10

## 2022-08-16 NOTE — ED PROVIDER NOTE - NSFOLLOWUPINSTRUCTIONS_ED_ALL_ED_FT
quarantine for 5 days or until all symptoms resolved  alternate between Tylenol & Motrin every 4 hours as needed with food  take your COVID medication within 5 days of your symptoms  if condition worsens, if your oxygen level Is low<91%, return to ED

## 2022-08-16 NOTE — ED PROVIDER NOTE - PROGRESS NOTE DETAILS
pt in no resp. distress, will d/c home with pulse ox.  Advised to f/u with PMD after all symptoms resolved

## 2022-08-19 ENCOUNTER — TRANSCRIPTION ENCOUNTER (OUTPATIENT)
Age: 77
End: 2022-08-19

## 2022-09-27 NOTE — HISTORY OF PRESENT ILLNESS
[de-identified] : CC Right knee\par \par HPI 73 yo female right HD presents for pt and csi fu of chronic onset of several months of activity related pain in the anterior Right knee [without injury]. The pain is worse, and rated a 8 out of 10, described as aching sharp burning throbbing, with radiation down the anterior calf. Rest makes the pain better and going up and down stairs makes the pain worse. The patient reports associated symptoms of of click locking grinding swelling. The patient - pain at night affecting sleep, and + similar pain previously.\par \par The patient has tried the following treatments:\par Activity modification	+\par Ice/Compression  	+\par Braces    		-\par Nsaids    		+\par Physical Therapy 	- \par Cortisone Injection	\par Visco Injection		-\par Arthroscopy		-\par \par near 1 yr help Rt knee csi\par worsening Lt knee pain several month, similar to right knee\par \par Review of Systems is positive for the above musculoskeletal symptoms and is otherwise non-contributory for general, constitutional, psychiatric, neurologic, HEENT, cardiac, respiratory, gastrointestinal, reproductive, lymphatic, and dermatologic complaints.\par \par Consult by Dr Anna Medina\par \par 
97

## 2022-12-10 ENCOUNTER — EMERGENCY (EMERGENCY)
Facility: HOSPITAL | Age: 77
LOS: 1 days | Discharge: HOME | End: 2022-12-10
Attending: STUDENT IN AN ORGANIZED HEALTH CARE EDUCATION/TRAINING PROGRAM
Payer: COMMERCIAL

## 2022-12-10 VITALS
WEIGHT: 190.7 LBS | HEART RATE: 66 BPM | DIASTOLIC BLOOD PRESSURE: 84 MMHG | TEMPERATURE: 98 F | SYSTOLIC BLOOD PRESSURE: 162 MMHG | OXYGEN SATURATION: 99 % | RESPIRATION RATE: 18 BRPM

## 2022-12-10 DIAGNOSIS — D24.9 BENIGN NEOPLASM OF UNSPECIFIED BREAST: Chronic | ICD-10-CM

## 2022-12-10 PROBLEM — G47.33 OBSTRUCTIVE SLEEP APNEA (ADULT) (PEDIATRIC): Chronic | Status: ACTIVE | Noted: 2022-08-16

## 2022-12-10 PROCEDURE — 99283 EMERGENCY DEPT VISIT LOW MDM: CPT

## 2022-12-10 PROCEDURE — 99282 EMERGENCY DEPT VISIT SF MDM: CPT

## 2022-12-10 RX ORDER — IBUPROFEN 200 MG
400 TABLET ORAL ONCE
Refills: 0 | Status: COMPLETED | OUTPATIENT
Start: 2022-12-10 | End: 2022-12-10

## 2022-12-10 NOTE — ED PROVIDER NOTE - CLINICAL SUMMARY MEDICAL DECISION MAKING FREE TEXT BOX
Pt p/w s/s of dental infection above R upper canine with no systemic symptoms. Rx clinda and rec dental f/u ASAP. Most likely dental infection - the details of the case, history, and exam make more emergent diagnoses much less likely. Discussed with pt my clinical impression and results, patient given strict return precautions if persistent or worsening of symptoms occurs, and need for close follow up. Pt expressed understanding and agrees with plan. Pt is well appearing with a reassuring exam. Discharge home with PMD or Specialist f/u within 5 days.

## 2022-12-10 NOTE — ED PROVIDER NOTE - NSFOLLOWUPCLINICS_GEN_ALL_ED_FT
[facility]  Dental  [address] [place], [place] [address]  Phone: [phone]  Fax:   Follow Up Time: 1-3 Days

## 2022-12-10 NOTE — ED PROVIDER NOTE - OBJECTIVE STATEMENT
76 y/o female with hx of HTN, LANDY, p/w 1 day of pain and swelling over the right maxillary canine. Pt denies any other associated sx, fever, sore throat, chest pain, shortness of breath, syncope, or any other recent illnesses and hospitalizations.

## 2022-12-10 NOTE — ED ADULT NURSE NOTE - NSIMPLEMENTINTERV_GEN_ALL_ED
Implemented All Universal Safety Interventions:  [place] to call system. Call bell, personal items and telephone within reach. Instruct patient to call for assistance. Room bathroom lighting operational. Non-slip footwear when patient is off stretcher. Physically safe environment: no spills, clutter or unnecessary equipment. Stretcher in lowest position, wheels locked, appropriate side rails in place.

## 2022-12-10 NOTE — ED ADULT NURSE NOTE - NS ED NURSE LEVEL OF CONSCIOUSNESS AFFECT
Order sent to [facility] for insulin pump supplies.
Patient is requesting a refill of Humalog (#9 vials) be sent to [facility]. I show this was discontinued on 4/27/21. Please advise    Also, he is requesting a referral for insulin pump supplies be sent to [facility].
Tried to call [facility] and they are have system problems and asked to call back in 1 hour.
Calm/Appropriate

## 2022-12-10 NOTE — ED PROVIDER NOTE - HIV OFFER
Immunization Documentation    Prior to Immunization administration, verified patients identity using patient's name and date of birth. Please see IMMUNIZATIONS  and order for additional information.  Patient / Parent instructed to remain in clinic for 15 minutes and report any adverse reaction to staff immediately.    Was entire vial of medication used? Yes  Vial/Syringe: Single dose vial & syringe    Shannen Hannah, [facility]  2/2/2022   1:26 PM     Previously Declined (within the last year)

## 2022-12-10 NOTE — ED PROVIDER NOTE - PHYSICAL EXAMINATION
Vital Signs Reviewed  GEN: Comfortable, NAD, AAOx3  HEENT: NCAT, MMM, Neck Supple  DENTAL: Gum superior to right maxillary canine with swelling and TTP.  RESP: CTAB, No rales/rhonchi/wheezing  CV: RRR, S1S2, No murmurs  ABD: No TTP, Soft, ND, No masses, No CVA Tenderness  Extrem/Skin: Equal pulses bilat, No cyanosis/edema/rashes  Neuro: No focal deficits

## 2022-12-10 NOTE — ED PROVIDER NOTE - NS_ATTENDINGSCRIBE_ED_ALL_ED
done I personally performed the service described in the documentation recorded by the scribe in my presence, and it accurately and completely records my words and actions.

## 2022-12-10 NOTE — ED PROVIDER NOTE - NSFOLLOWUPINSTRUCTIONS_ED_ALL_ED_FT
You were seen in the emergency room today for a dental infection. Please see the dentist at the next available appointment.    Please call your primary doctor to inform them of this ER visit and obtain the next available appointment within the next 5 days. As we discussed, return to the ER if you have any worsening symptoms.    We no longer feel that you need further emergency care or admission to the hospital at this time.    While we have determined that you are currently stable for discharge, we know that things can change. Please seek immediate medical attention or return to the ER if you experience any of the following:  Any worsening or persistent symptoms  Severe Pain  Chest Pain  Difficulty Breathing  Bleeding  Passing Out  Severe Rash  Inability to Eat or Drink  Persistent Fever    Please see a primary care doctor or specialist within 5 days to ensure that you are improving.    Please call the [email or web address] phone numbers on this document if you have any problems obtaining a follow up appointment.    I wish you well! -Dr Evans

## 2023-01-16 ENCOUNTER — EMERGENCY (EMERGENCY)
Facility: HOSPITAL | Age: 78
LOS: 1 days | Discharge: HOME | End: 2023-01-16
Payer: COMMERCIAL

## 2023-01-16 VITALS
HEIGHT: 67 IN | WEIGHT: 186.95 LBS | TEMPERATURE: 99 F | RESPIRATION RATE: 18 BRPM | DIASTOLIC BLOOD PRESSURE: 79 MMHG | OXYGEN SATURATION: 98 % | SYSTOLIC BLOOD PRESSURE: 115 MMHG | HEART RATE: 79 BPM

## 2023-01-16 DIAGNOSIS — D24.9 BENIGN NEOPLASM OF UNSPECIFIED BREAST: Chronic | ICD-10-CM

## 2023-01-16 LAB
B PERT DNA SPEC QL NAA+PROBE: SIGNIFICANT CHANGE UP
C PNEUM DNA SPEC QL NAA+PROBE: SIGNIFICANT CHANGE UP
FLUAV H1 2009 PAND RNA SPEC QL NAA+PROBE: SIGNIFICANT CHANGE UP
FLUAV H1 RNA SPEC QL NAA+PROBE: SIGNIFICANT CHANGE UP
FLUAV H3 RNA SPEC QL NAA+PROBE: SIGNIFICANT CHANGE UP
FLUAV SUBTYP SPEC NAA+PROBE: SIGNIFICANT CHANGE UP
FLUBV RNA SPEC QL NAA+PROBE: SIGNIFICANT CHANGE UP
HADV DNA SPEC QL NAA+PROBE: SIGNIFICANT CHANGE UP
HCOV PNL SPEC NAA+PROBE: SIGNIFICANT CHANGE UP
HMPV RNA SPEC QL NAA+PROBE: SIGNIFICANT CHANGE UP
HPIV1 RNA SPEC QL NAA+PROBE: SIGNIFICANT CHANGE UP
HPIV2 RNA SPEC QL NAA+PROBE: SIGNIFICANT CHANGE UP
HPIV3 RNA SPEC QL NAA+PROBE: SIGNIFICANT CHANGE UP
HPIV4 RNA SPEC QL NAA+PROBE: SIGNIFICANT CHANGE UP
RAPID RVP RESULT: SIGNIFICANT CHANGE UP
RSV RNA SPEC QL NAA+PROBE: SIGNIFICANT CHANGE UP
RV+EV RNA SPEC QL NAA+PROBE: SIGNIFICANT CHANGE UP
SARS-COV-2 RNA SPEC QL NAA+PROBE: SIGNIFICANT CHANGE UP

## 2023-01-16 PROCEDURE — 99283 EMERGENCY DEPT VISIT LOW MDM: CPT

## 2023-01-16 PROCEDURE — 99284 EMERGENCY DEPT VISIT MOD MDM: CPT

## 2023-01-16 PROCEDURE — 0225U NFCT DS DNA&RNA 21 SARSCOV2: CPT

## 2023-01-16 RX ORDER — MICONAZOLE NITRATE 2 %
1 CREAM (GRAM) TOPICAL
Qty: 30 | Refills: 0
Start: 2023-01-16 | End: 2023-01-22

## 2023-01-16 NOTE — ED PROVIDER NOTE - OBJECTIVE STATEMENT
77-year-old female, history of hypertension, currently getting work-up for goiter, recently finished treatment for H. pylori, presents for evaluation of multiple medical complaints.  In the last 2 to 3 days darted having intermittent muscle aches to the back, dryness of the mouth and nose feeling tired.  Also reports decrease appetite.  Feels better with Tylenol.  Still able to drink water.  Also reports since finishing the end antibiotics for the H. pylori infection has developed vaginal itching with white-yellowish discharge.  Denies any fever, night sweats, dizziness, chest pain, shortness of breath, urinary symptoms, rash, abdominal pain or any other complaints.  Patient is a non-smoker.

## 2023-01-16 NOTE — ED PROVIDER NOTE - PATIENT PORTAL LINK FT
You can access the FollowMyHealth Patient Portal offered by [facility] by registering at the following website: [email or web address]. By joining [employer]’s FollowMyHealth portal, you will also be able to view your health information using other applications (apps) compatible with our system.

## 2023-01-16 NOTE — ED PROVIDER NOTE - CLINICAL SUMMARY MEDICAL DECISION MAKING FREE TEXT BOX
77-year-old female, presents with viral-like symptoms x3 days.  Well-appearing, vital signs stable, afebrile.  No signs of meningeal irritation.  Lungs CTA.  Unremarkable abdominal exam.  No CVAT.  History of symptoms suggestive of likely viral syndrome.  We will send RVP swab.  Based on patient's history of taking 2 antibiotics, the pruritic vaginal discharge is most likely related to yeast infection.  Will give Monistat cream.  Patient has GYN and can follow-up within 1 week.  Patient will also need to follow-up with PMD within 2 to 3 days.

## 2023-01-16 NOTE — ED ADULT NURSE NOTE - CAS ELECT INFOMATION PROVIDED
Pt seen, treated and released by URIEL Carvajal, prior to RN assessment. Pt not seen by documenting RN/DC instructions

## 2023-01-16 NOTE — ED POST DISCHARGE NOTE - DETAILS
Left voicemail to patient explaining that "swab results are normal". informed of negative results. Patient is feeling better today.

## 2023-01-16 NOTE — ED PROVIDER NOTE - NSFOLLOWUPINSTRUCTIONS_ED_ALL_ED_FT
Follow up with the primary care doctor in 2-3 days.  Follow up with the gynecologist within 1 week.  If you experience any new or worsening symptoms or if you are concerned you can always come back to the emergency for a re-evaluation.  If there were any prescriptions given to you during the visit today take them as prescribed. If you have any questions you can ask the pharmacist.

## 2023-01-19 NOTE — ED PROVIDER NOTE - TEMPLATE, MLM
Eye Detail Level: Detailed Price (Use Numbers Only, No Special Characters Or $): 25 Size Of Lesion In Cm: 0.6 X Size Of Lesion In Cm (Optional): 0 Anesthesia Type: 1% lidocaine with epinephrine Hemostasis: Jase's Wound Care: Petrolatum Consent was obtained from the patient. The risks and benefits to therapy were discussed in detail. Specifically, the risks of infection, scarring, bleeding, prolonged wound healing, incomplete removal, allergy to anesthesia, nerve injury and recurrence were addressed. Prior to the procedure, the treatment site was clearly identified and confirmed by the patient. All components of Universal Protocol/PAUSE Rule completed. Render Post-Care Instructions In Note?: no Post-Care Instructions: I reviewed with the patient in detail post-care instructions. Patient is to keep the biopsy site dry overnight, and then apply bacitracin twice daily until healed. Patient may apply hydrogen peroxide soaks to remove any crusting.

## 2023-02-11 ENCOUNTER — EMERGENCY (EMERGENCY)
Facility: HOSPITAL | Age: 78
LOS: 1 days | Discharge: HOME | End: 2023-02-11
Attending: EMERGENCY MEDICINE
Payer: COMMERCIAL

## 2023-02-11 VITALS
TEMPERATURE: 98 F | OXYGEN SATURATION: 100 % | RESPIRATION RATE: 18 BRPM | HEART RATE: 71 BPM | DIASTOLIC BLOOD PRESSURE: 72 MMHG | SYSTOLIC BLOOD PRESSURE: 137 MMHG

## 2023-02-11 VITALS
TEMPERATURE: 98 F | DIASTOLIC BLOOD PRESSURE: 87 MMHG | SYSTOLIC BLOOD PRESSURE: 170 MMHG | WEIGHT: 182.1 LBS | OXYGEN SATURATION: 96 % | HEIGHT: 67 IN | HEART RATE: 70 BPM | RESPIRATION RATE: 18 BRPM

## 2023-02-11 DIAGNOSIS — D24.9 BENIGN NEOPLASM OF UNSPECIFIED BREAST: Chronic | ICD-10-CM

## 2023-02-11 LAB
ALBUMIN SERPL ELPH-MCNC: 3.4 G/DL — LOW (ref 3.5–5)
ALP SERPL-CCNC: 72 U/L — SIGNIFICANT CHANGE UP (ref 40–120)
ALT FLD-CCNC: 21 U/L DA — SIGNIFICANT CHANGE UP (ref 10–60)
ANION GAP SERPL CALC-SCNC: 7 MMOL/L — SIGNIFICANT CHANGE UP (ref 5–17)
AST SERPL-CCNC: 14 U/L — SIGNIFICANT CHANGE UP (ref 10–40)
BASOPHILS # BLD AUTO: 0.02 K/UL — SIGNIFICANT CHANGE UP (ref 0–0.2)
BASOPHILS NFR BLD AUTO: 0.3 % — SIGNIFICANT CHANGE UP (ref 0–2)
BILIRUB SERPL-MCNC: 0.4 MG/DL — SIGNIFICANT CHANGE UP (ref 0.2–1.2)
BUN SERPL-MCNC: 17 MG/DL — SIGNIFICANT CHANGE UP (ref 7–18)
CALCIUM SERPL-MCNC: 10 MG/DL — SIGNIFICANT CHANGE UP (ref 8.4–10.5)
CHLORIDE SERPL-SCNC: 103 MMOL/L — SIGNIFICANT CHANGE UP (ref 96–108)
CO2 SERPL-SCNC: 29 MMOL/L — SIGNIFICANT CHANGE UP (ref 22–31)
CREAT SERPL-MCNC: 0.81 MG/DL — SIGNIFICANT CHANGE UP (ref 0.5–1.3)
EGFR: 75 ML/MIN/1.73M2 — SIGNIFICANT CHANGE UP
EOSINOPHIL # BLD AUTO: 0.14 K/UL — SIGNIFICANT CHANGE UP (ref 0–0.5)
EOSINOPHIL NFR BLD AUTO: 2 % — SIGNIFICANT CHANGE UP (ref 0–6)
ERYTHROCYTE [SEDIMENTATION RATE] IN BLOOD: 23 MM/HR — HIGH (ref 0–20)
GLUCOSE SERPL-MCNC: 100 MG/DL — HIGH (ref 70–99)
HCT VFR BLD CALC: 41.8 % — SIGNIFICANT CHANGE UP (ref 34.5–45)
HGB BLD-MCNC: 13.1 G/DL — SIGNIFICANT CHANGE UP (ref 11.5–15.5)
IMM GRANULOCYTES NFR BLD AUTO: 0.1 % — SIGNIFICANT CHANGE UP (ref 0–0.9)
LYMPHOCYTES # BLD AUTO: 1.93 K/UL — SIGNIFICANT CHANGE UP (ref 1–3.3)
LYMPHOCYTES # BLD AUTO: 28 % — SIGNIFICANT CHANGE UP (ref 13–44)
MAGNESIUM SERPL-MCNC: 2.1 MG/DL — SIGNIFICANT CHANGE UP (ref 1.6–2.6)
MCHC RBC-ENTMCNC: 26.6 PG — LOW (ref 27–34)
MCHC RBC-ENTMCNC: 31.3 GM/DL — LOW (ref 32–36)
MCV RBC AUTO: 85 FL — SIGNIFICANT CHANGE UP (ref 80–100)
MONOCYTES # BLD AUTO: 0.62 K/UL — SIGNIFICANT CHANGE UP (ref 0–0.9)
MONOCYTES NFR BLD AUTO: 9 % — SIGNIFICANT CHANGE UP (ref 2–14)
NEUTROPHILS # BLD AUTO: 4.17 K/UL — SIGNIFICANT CHANGE UP (ref 1.8–7.4)
NEUTROPHILS NFR BLD AUTO: 60.6 % — SIGNIFICANT CHANGE UP (ref 43–77)
NRBC # BLD: 0 /100 WBCS — SIGNIFICANT CHANGE UP (ref 0–0)
PLATELET # BLD AUTO: 226 K/UL — SIGNIFICANT CHANGE UP (ref 150–400)
POTASSIUM SERPL-MCNC: 3.9 MMOL/L — SIGNIFICANT CHANGE UP (ref 3.5–5.3)
POTASSIUM SERPL-SCNC: 3.9 MMOL/L — SIGNIFICANT CHANGE UP (ref 3.5–5.3)
PROT SERPL-MCNC: 7.9 G/DL — SIGNIFICANT CHANGE UP (ref 6–8.3)
RBC # BLD: 4.92 M/UL — SIGNIFICANT CHANGE UP (ref 3.8–5.2)
RBC # FLD: 13.6 % — SIGNIFICANT CHANGE UP (ref 10.3–14.5)
SODIUM SERPL-SCNC: 139 MMOL/L — SIGNIFICANT CHANGE UP (ref 135–145)
WBC # BLD: 6.89 K/UL — SIGNIFICANT CHANGE UP (ref 3.8–10.5)
WBC # FLD AUTO: 6.89 K/UL — SIGNIFICANT CHANGE UP (ref 3.8–10.5)

## 2023-02-11 PROCEDURE — 36415 COLL VENOUS BLD VENIPUNCTURE: CPT

## 2023-02-11 PROCEDURE — 99284 EMERGENCY DEPT VISIT MOD MDM: CPT | Mod: 25

## 2023-02-11 PROCEDURE — 80053 COMPREHEN METABOLIC PANEL: CPT

## 2023-02-11 PROCEDURE — 85652 RBC SED RATE AUTOMATED: CPT

## 2023-02-11 PROCEDURE — 83735 ASSAY OF MAGNESIUM: CPT

## 2023-02-11 PROCEDURE — 70491 CT SOFT TISSUE NECK W/DYE: CPT | Mod: MA

## 2023-02-11 PROCEDURE — 99284 EMERGENCY DEPT VISIT MOD MDM: CPT

## 2023-02-11 PROCEDURE — 70491 CT SOFT TISSUE NECK W/DYE: CPT | Mod: 26,MA

## 2023-02-11 PROCEDURE — 85025 COMPLETE CBC W/AUTO DIFF WBC: CPT

## 2023-02-11 RX ORDER — SODIUM CHLORIDE 9 MG/ML
1000 INJECTION INTRAMUSCULAR; INTRAVENOUS; SUBCUTANEOUS
Refills: 0 | Status: DISCONTINUED | OUTPATIENT
Start: 2023-02-11 | End: 2023-02-15

## 2023-02-11 NOTE — ED PROVIDER NOTE - CLINICAL SUMMARY MEDICAL DECISION MAKING FREE TEXT BOX
s/p thyroid Bx, now c/o throat discomfort, concern for hematoma, swelling.  no obvious abn on exam.  Will get labs., CT neck, reassess

## 2023-02-11 NOTE — ED ADULT NURSE NOTE - PRO INTERPRETER NEED 2
----- Message from Indiana Olmos PA-C sent at 10/3/2022  5:38 PM CDT -----  Please repeat CBC in one month. Ensure to be hydrated prior to lab.   
Spoke with pt and advised her of below. Scheduled labs for 10.26.22 in [place]. Pt verbalized understanding   
English

## 2023-02-11 NOTE — ED PROVIDER NOTE - NSICDXPASTMEDICALHX_GEN_ALL_CORE_FT
PAST MEDICAL HISTORY:  H/O goiter     HTN (hypertension)     Obstructive sleep apnea      PAST MEDICAL HISTORY:  H/O goiter     HTN (hypertension)     Obstructive sleep apnea       Former smoker

## 2023-02-11 NOTE — ED PROVIDER NOTE - PROGRESS NOTE DETAILS
labs/CT result explained to pt.  Pt in no distress, will d/c home with sister.  Advised to f/u with PMD

## 2023-02-11 NOTE — ED PROVIDER NOTE - NSFOLLOWUPINSTRUCTIONS_ED_ALL_ED_FT
Abrasion      An abrasion is a cut or a scrape on the surface of the skin. An abrasion does not go through all the layers of the skin. It is important to care for an abrasion properly to prevent infection.      What are the causes?    This condition is caused by rubbing your skin on something or falling on a surface, such as the ground. When your skin rubs on something, some layers of skin may rub off.      What are the signs or symptoms?    The main symptom of this condition is a cut or a scrape. The cut or scrape may be bleeding, or it may appear red or pink. If your abrasion was caused by a fall, there may be a bruise under your cut or scrape.      How is this diagnosed?    An abrasion is diagnosed with a physical exam.      How is this treated?    Treatment for this condition depends on how large and deep the abrasion is. In most cases:  •Your abrasion will be cleaned with water and mild soap. This is done to remove any dirt or debris (such as tiny bits of glass or rock) that may be stuck in your wound.      •An antibiotic ointment may be applied to your abrasion to help prevent infection.      •A bandage (dressing) may be placed on your abrasion to keep it clean.      You may also need a tetanus shot.      Follow these instructions at home:    Medicines     •Take or apply over-the-counter and prescription medicines only as told by your health care provider.      •If you were prescribed an antibiotic medicine, use it as told by your health care provider. Do not stop using the antibiotic even if you start to feel better.      Wound care   •Clean your wound 1 or 2 times a day, or as told by your health care provider. To do this:  1.Wash your hands for at least 20 seconds with mild soap and water. Do this before and after you clean your wound.      2.Wash your wound with mild soap and water and then rinse off the soap.      3.Pat your wound dry with a clean towel. Do not rub your wound.        •Keep your dressing clean and dry as told by your health care provider.      •There are many different ways to close and cover a wound. Follow instructions from your health care provider about caring for your wound and about changing and removing your dressing. You may have to change your dressing one or more times a day, or as directed by your health care provider.    •Check your wound every day for signs of infection. Check for:  •Redness, especially a red streak that spreads out from your wound.      •Swelling or increased pain.      •Warmth.      •Blood, fluid, pus, or a bad smell.          Managing pain and swelling    •If directed, put ice on the injured area. To do this:  •Put ice in a plastic bag.       •Place a towel between your skin and the bag.       • Leave the ice on for 20 minutes, 2–3 times a day.      •Remove the ice if your skin turns bright red. This is very important. If you cannot feel pain, heat, or cold, you have a greater risk of damage to the area.        •If possible, raise (elevate) the injured area above the level of your heart while you are sitting or lying down.      General instructions     • Do not take baths, swim, or use a hot tub until your health care provider approves. Ask your doctor about taking showers or sponge baths.      •Keep all follow-up visits. This is important.        Contact a health care provider if:    •You received a tetanus shot, and you have swelling, severe pain, redness, or bleeding at your injection site.      •Your pain is not controlled with medicine.      •You have a fever.    •You have any of these signs of infection:  •Redness, swelling, or more pain around your wound.      •Warmth coming from your wound.      •Blood, fluid, pus, or a bad smell coming from your wound.          Get help right away if:    •You have a red streak spreading away from your wound.        Summary    •An abrasion is a cut or a scrape on the surface of the skin. Care for your abrasion properly to prevent infection.      •Clean your wound with mild soap and water 1 or 2 times a day or as often as told. Follow instructions from your health care provider about taking medicines and changing your bandage (dressing).      •Contact your health care provider if you have a fever or if you have redness, swelling, or more pain around your wound.      •Contact your health care provider if you have warmth, blood, fluid, pus, or a bad smell coming from your wound.      •Get help right away if there is a red streak spreading away from your wound.      This information is not intended to replace advice given to you by your health care provider. Make sure you discuss any questions you have with your health care provider.          Thyroid Needle Biopsy, Care After      This sheet gives you information about how to care for yourself after your procedure. Your health care provider may also give you more specific instructions. If you have problems or questions, contact your health care provider.      What can I expect after the procedure?    After the procedure, it is common to have:  •Soreness and tenderness that lasts for a few days.      •Bruising where the needle was inserted (puncture site).        Follow these instructions at home:     •Take over-the-counter and prescription medicines only as told by your health care provider.      •To help ease discomfort, keep your head raised (elevated) when you are lying down. When you move from lying down to sitting up, use both hands to support the back of your head and neck.    •Check your puncture site every day for signs of infection. Check for:  •Redness, swelling, or pain.      •Fluid or blood.      •Warmth.      •Pus or a bad smell.        •Return to your normal activities as told by your health care provider. Ask your health care provider what activities are safe for you.      •Keep all follow-up visits as told by your health care provider. This is important.        Contact a health care provider if:    •You have redness, swelling, or pain around your puncture site.      •You have fluid or blood coming from your puncture site.      •Your puncture site feels warm to the touch.      •You have pus or a bad smell coming from your puncture site.      •You have a fever.        Get help right away if:    •You have severe bleeding from the puncture site.      •You have difficulty swallowing.      •You have swollen glands (lymph nodes) in your neck.        Summary    •It is common to have some bruising and soreness where the needle was inserted in your lower front neck area (puncture site).      •Check your puncture site every day for signs of infection, such as redness, swelling, or pain.      •Get help right away if you have severe bleeding from your puncture site.      This information is not intended to replace advice given to you by your health care provider. Make sure you discuss any questions you have with your health care provider.    follow up with your medical doctor regarding your biopsy result Thyroid Needle Biopsy, Care After      This sheet gives you information about how to care for yourself after your procedure. Your health care provider may also give you more specific instructions. If you have problems or questions, contact your health care provider.      What can I expect after the procedure?    After the procedure, it is common to have:  •Soreness and tenderness that lasts for a few days.      •Bruising where the needle was inserted (puncture site).        Follow these instructions at home:     •Take over-the-counter and prescription medicines only as told by your health care provider.      •To help ease discomfort, keep your head raised (elevated) when you are lying down. When you move from lying down to sitting up, use both hands to support the back of your head and neck.    •Check your puncture site every day for signs of infection. Check for:  •Redness, swelling, or pain.      •Fluid or blood.      •Warmth.      •Pus or a bad smell.        •Return to your normal activities as told by your health care provider. Ask your health care provider what activities are safe for you.      •Keep all follow-up visits as told by your health care provider. This is important.        Contact a health care provider if:    •You have redness, swelling, or pain around your puncture site.      •You have fluid or blood coming from your puncture site.      •Your puncture site feels warm to the touch.      •You have pus or a bad smell coming from your puncture site.      •You have a fever.        Get help right away if:    •You have severe bleeding from the puncture site.      •You have difficulty swallowing.      •You have swollen glands (lymph nodes) in your neck.        Summary    •It is common to have some bruising and soreness where the needle was inserted in your lower front neck area (puncture site).      •Check your puncture site every day for signs of infection, such as redness, swelling, or pain.      •Get help right away if you have severe bleeding from your puncture site.      This information is not intended to replace advice given to you by your health care provider. Make sure you discuss any questions you have with your health care provider.    follow up with your medical doctor regarding your biopsy result        Thyroid Nodule       A thyroid nodule is an isolated growth of thyroid cells that forms a lump in your thyroid gland. The thyroid gland is a butterfly-shaped gland. It is found in the lower front of your neck. This gland sends chemical messengers (hormones) through your blood to all parts of your body. These hormones are important in regulating your body temperature and helping your body to use energy.    Thyroid nodules are common. Most are not cancerous (benign). You may have one nodule or several nodules.    Different types of thyroid nodules include nodules that:  •Grow and fill with fluid (thyroid cysts).      •Produce too much thyroid hormone (hot nodules or hyperthyroid).      •Produce no thyroid hormone (cold nodules or hypothyroid).      •Form from cancer cells (thyroid cancers).        What are the causes?    In most cases, the cause of this condition is not known.      What increases the risk?    The following factors may make you more likely to develop this condition.  •Age. Thyroid nodules become more common in people who are older than 45 years of age.    •Gender.  •Benign thyroid nodules are more common in women.      •Cancerous (malignant) thyroid nodules are more common in men.      •A family history that includes:  •Thyroid nodules.      •Pheochromocytoma.      •Thyroid carcinoma.      •Hyperparathyroidism.        •Certain kinds of thyroid diseases, such as Hashimoto's thyroiditis.      •Lack of iodine in your diet.      •A history of head and neck radiation, such as from previous cancer treatment.        What are the signs or symptoms?    In many cases, there are no symptoms. If you have symptoms, they may include:  •A lump in your lower neck.      •Feeling a lump or tickle in your throat.      •Pain in your neck, jaw, or ear.      •Having trouble swallowing.      Hot nodules may cause symptoms that include:  •Weight loss.      •Warm, flushed skin.      •Feeling hot.      •Feeling nervous.      •A racing heartbeat.      Cold nodules may cause symptoms that include:  •Weight gain.      •Dry skin.      •Brittle hair. This may also occur with hair loss.      •Feeling cold.      •Fatigue.      Thyroid cancer nodules may cause symptoms that include:  •Hard nodules that feel stuck to the thyroid gland.      •Hoarseness.      •Lumps in the glands near your thyroid (lymph nodes).        How is this diagnosed?    A thyroid nodule may be felt by your health care provider during a physical exam. This condition may also be diagnosed based on your symptoms. You may also have tests, including:  •An ultrasound. This may be done to confirm the diagnosis.      •A biopsy. This involves taking a sample from the nodule and looking at it under a microscope.      •Blood tests to make sure that your thyroid is working properly.      •A thyroid scan. This test uses a radioactive tracer injected into a vein to create an image of the thyroid gland on a computer screen.    •Imaging tests such as MRI or CT scan. These may be done if:  •Your nodule is large.      •Your nodule is blocking your airway.      •Cancer is suspected.          How is this treated?    Treatment depends on the cause and size of your nodule or nodules. If the nodule is benign, treatment may not be necessary. Your health care provider may monitor the nodule to see if it goes away without treatment. If the nodule continues to grow, is cancerous, or does not go away, treatment may be needed. Treatment may include:  •Having a cystic nodule drained with a needle.      •Ablation therapy. In this treatment, alcohol is injected into the area of the nodule to destroy the cells. Ablation with heat (thermal ablation) may also be used.      •Radioactive iodine. In this treatment, radioactive iodine is given as a pill or liquid that you drink. This substance causes the thyroid nodule to shrink.      •Surgery to remove the nodule. Part or all of your thyroid gland may need to be removed as well.      •Medicines.        Follow these instructions at home:    •Pay attention to any changes in your nodule.      •Take over-the-counter and prescription medicines only as told by your health care provider.      •Keep all follow-up visits as told by your health care provider. This is important.        Contact a health care provider if:    •Your voice changes.      •You have trouble swallowing.      •You have pain in your neck, ear, or jaw that is getting worse.      •Your nodule gets bigger.      •Your nodule starts to make it harder for you to breathe.      •Your muscles look like they are shrinking (muscle wasting).        Get help right away if:    •You have chest pain.      •There is a loss of consciousness.      •You have a sudden fever.      •You feel confused.      •You are seeing or hearing things that other people do not see or hear (having hallucinations).      •You feel very weak.      •You have mood swings.      •You feel very restless.      •You feel suddenly nauseous or throw up.      •You suddenly have diarrhea.        Summary    •A thyroid nodule is an isolated growth of thyroid cells that forms a lump in your thyroid gland.      •Thyroid nodules are common. Most are not cancerous (benign). You may have one nodule or several nodules.      •Treatment depends on the cause and size of your nodule or nodules. If the nodule is benign, treatment may not be necessary.      •Your health care provider may monitor the nodule to see if it goes away without treatment. If the nodule continues to grow, is cancerous, or does not go away, treatment may be needed.      This information is not intended to replace advice given to you by your health care provider. Make sure you discuss any questions you have with your health care provider.

## 2023-02-11 NOTE — ED PROVIDER NOTE - OBJECTIVE STATEMENT
78 y/o female, history of hypertension, LANDY, goiter, former smoker, had a sonogram on February 2 that was normal. States she went home and had throat soreness for 1-2 days and three days ago she developed difficulty swallowing. Felt like her throat was "full." No drooling. Can swallow solids/liquids. No vomiting, no fever, no swelling of the neck. Soreness described as discomfort, 4-5/10. Patient is allergic to penicillin. 78 y/o female, history of hypertension, LANDY, goiter, former smoker, had a Rt sided thyroid Bx on February 2 which was uneventful. States she went home and had throat soreness for 1-2 days and three days ago she developed difficulty swallowing. Felt like her throat was "full." No drooling. Can swallow solids/liquids. No vomiting, no fever, no swelling of the neck. Soreness described as discomfort, 4-5/10. Patient is allergic to penicillin.

## 2023-02-11 NOTE — ED ADULT NURSE NOTE - OBJECTIVE STATEMENT
pt present with complaintofdifficulty swallowing since weds, chronic sinusesand postnasal drip. Pt reports she had a thyroid biopsy on

## 2023-02-11 NOTE — ED PROVIDER NOTE - PATIENT PORTAL LINK FT
You can access the FollowMyHealth Patient Portal offered by [facility] [place] by registering at the following website: [email or web address]. By joining [facility]’s FollowMyHealth portal, you will also be able to view your health information using other applications (apps) compatible with our system.

## 2023-02-11 NOTE — ED PROVIDER NOTE - ENMT, MLM
Airway patent, Nasal mucosa clear. Mouth with normal mucosa. Throat has no vesicles, no oropharyngeal exudates and uvula is midline. neck- no stridor

## 2023-02-12 PROBLEM — Z86.39 PERSONAL HISTORY OF OTHER ENDOCRINE, NUTRITIONAL AND METABOLIC DISEASE: Chronic | Status: ACTIVE | Noted: 2023-01-16

## 2023-05-02 NOTE — ED ADULT TRIAGE NOTE - SOURCE OF INFORMATION
4 sessions of physical therapy ordered for bilateral knee osteoarthritis and gluteus medius tendinopathy.    Electronically signed by Petros Jackson MD 5/1/2023          Patient

## 2023-05-17 NOTE — ED PROVIDER NOTE - CONSTITUTIONAL APPEARANCE HYGIENE, MLM
good hygiene Libtayo Pregnancy And Lactation Text: This medication is contraindicated in pregnancy and when breast feeding.

## 2023-07-06 NOTE — ED ADULT TRIAGE NOTE - HEIGHT IN CM
170.18 Humira Pregnancy And Lactation Text: This medication is Pregnancy Category B and is considered safe during pregnancy. It is unknown if this medication is excreted in breast milk.

## 2023-07-12 ENCOUNTER — INPATIENT (INPATIENT)
Facility: HOSPITAL | Age: 78
LOS: 1 days | Discharge: HOME | DRG: 641 | End: 2023-07-14
Attending: INTERNAL MEDICINE | Admitting: INTERNAL MEDICINE
Payer: COMMERCIAL

## 2023-07-12 VITALS
HEART RATE: 77 BPM | DIASTOLIC BLOOD PRESSURE: 80 MMHG | WEIGHT: 185.85 LBS | TEMPERATURE: 99 F | SYSTOLIC BLOOD PRESSURE: 155 MMHG | RESPIRATION RATE: 18 BRPM | OXYGEN SATURATION: 100 %

## 2023-07-12 DIAGNOSIS — D24.9 BENIGN NEOPLASM OF UNSPECIFIED BREAST: Chronic | ICD-10-CM

## 2023-07-12 LAB
ALBUMIN SERPL ELPH-MCNC: 3.4 G/DL — LOW (ref 3.5–5)
ALP SERPL-CCNC: 64 U/L — SIGNIFICANT CHANGE UP (ref 40–120)
ALT FLD-CCNC: 19 U/L DA — SIGNIFICANT CHANGE UP (ref 10–60)
ANION GAP SERPL CALC-SCNC: 2 MMOL/L — LOW (ref 5–17)
APPEARANCE UR: CLEAR — SIGNIFICANT CHANGE UP
APTT BLD: 31.8 SEC — SIGNIFICANT CHANGE UP (ref 27.5–35.5)
AST SERPL-CCNC: 18 U/L — SIGNIFICANT CHANGE UP (ref 10–40)
BACTERIA # UR AUTO: ABNORMAL /HPF
BASOPHILS # BLD AUTO: 0.04 K/UL — SIGNIFICANT CHANGE UP (ref 0–0.2)
BASOPHILS NFR BLD AUTO: 0.6 % — SIGNIFICANT CHANGE UP (ref 0–2)
BILIRUB SERPL-MCNC: 0.5 MG/DL — SIGNIFICANT CHANGE UP (ref 0.2–1.2)
BILIRUB UR-MCNC: NEGATIVE — SIGNIFICANT CHANGE UP
BUN SERPL-MCNC: 11 MG/DL — SIGNIFICANT CHANGE UP (ref 7–18)
CALCIUM SERPL-MCNC: 9.4 MG/DL — SIGNIFICANT CHANGE UP (ref 8.4–10.5)
CHLORIDE SERPL-SCNC: 105 MMOL/L — SIGNIFICANT CHANGE UP (ref 96–108)
CO2 SERPL-SCNC: 31 MMOL/L — SIGNIFICANT CHANGE UP (ref 22–31)
COLOR SPEC: YELLOW — SIGNIFICANT CHANGE UP
CREAT SERPL-MCNC: 0.86 MG/DL — SIGNIFICANT CHANGE UP (ref 0.5–1.3)
DIFF PNL FLD: NEGATIVE — SIGNIFICANT CHANGE UP
EGFR: 70 ML/MIN/1.73M2 — SIGNIFICANT CHANGE UP
EOSINOPHIL # BLD AUTO: 0.12 K/UL — SIGNIFICANT CHANGE UP (ref 0–0.5)
EOSINOPHIL NFR BLD AUTO: 1.7 % — SIGNIFICANT CHANGE UP (ref 0–6)
GLUCOSE SERPL-MCNC: 94 MG/DL — SIGNIFICANT CHANGE UP (ref 70–99)
GLUCOSE UR QL: NEGATIVE — SIGNIFICANT CHANGE UP
HCT VFR BLD CALC: 40.1 % — SIGNIFICANT CHANGE UP (ref 34.5–45)
HGB BLD-MCNC: 12.8 G/DL — SIGNIFICANT CHANGE UP (ref 11.5–15.5)
IMM GRANULOCYTES NFR BLD AUTO: 0.3 % — SIGNIFICANT CHANGE UP (ref 0–0.9)
INR BLD: 1.07 RATIO — SIGNIFICANT CHANGE UP (ref 0.88–1.16)
KETONES UR-MCNC: NEGATIVE — SIGNIFICANT CHANGE UP
LEUKOCYTE ESTERASE UR-ACNC: NEGATIVE — SIGNIFICANT CHANGE UP
LYMPHOCYTES # BLD AUTO: 1.6 K/UL — SIGNIFICANT CHANGE UP (ref 1–3.3)
LYMPHOCYTES # BLD AUTO: 22.9 % — SIGNIFICANT CHANGE UP (ref 13–44)
MCHC RBC-ENTMCNC: 26.9 PG — LOW (ref 27–34)
MCHC RBC-ENTMCNC: 31.9 GM/DL — LOW (ref 32–36)
MCV RBC AUTO: 84.2 FL — SIGNIFICANT CHANGE UP (ref 80–100)
MONOCYTES # BLD AUTO: 0.58 K/UL — SIGNIFICANT CHANGE UP (ref 0–0.9)
MONOCYTES NFR BLD AUTO: 8.3 % — SIGNIFICANT CHANGE UP (ref 2–14)
NEUTROPHILS # BLD AUTO: 4.64 K/UL — SIGNIFICANT CHANGE UP (ref 1.8–7.4)
NEUTROPHILS NFR BLD AUTO: 66.2 % — SIGNIFICANT CHANGE UP (ref 43–77)
NITRITE UR-MCNC: NEGATIVE — SIGNIFICANT CHANGE UP
NRBC # BLD: 0 /100 WBCS — SIGNIFICANT CHANGE UP (ref 0–0)
PH UR: 6.5 — SIGNIFICANT CHANGE UP (ref 5–8)
PLATELET # BLD AUTO: 226 K/UL — SIGNIFICANT CHANGE UP (ref 150–400)
POTASSIUM SERPL-MCNC: 3.7 MMOL/L — SIGNIFICANT CHANGE UP (ref 3.5–5.3)
POTASSIUM SERPL-SCNC: 3.7 MMOL/L — SIGNIFICANT CHANGE UP (ref 3.5–5.3)
PROT SERPL-MCNC: 7.8 G/DL — SIGNIFICANT CHANGE UP (ref 6–8.3)
PROT UR-MCNC: NEGATIVE — SIGNIFICANT CHANGE UP
PROTHROM AB SERPL-ACNC: 12.7 SEC — SIGNIFICANT CHANGE UP (ref 10.5–13.4)
RBC # BLD: 4.76 M/UL — SIGNIFICANT CHANGE UP (ref 3.8–5.2)
RBC # FLD: 13.4 % — SIGNIFICANT CHANGE UP (ref 10.3–14.5)
RBC CASTS # UR COMP ASSIST: SIGNIFICANT CHANGE UP /HPF (ref 0–2)
SODIUM SERPL-SCNC: 138 MMOL/L — SIGNIFICANT CHANGE UP (ref 135–145)
SP GR SPEC: 1.01 — SIGNIFICANT CHANGE UP (ref 1.01–1.02)
TROPONIN I, HIGH SENSITIVITY RESULT: 5.6 NG/L — SIGNIFICANT CHANGE UP
UROBILINOGEN FLD QL: NEGATIVE — SIGNIFICANT CHANGE UP
WBC # BLD: 7 K/UL — SIGNIFICANT CHANGE UP (ref 3.8–10.5)
WBC # FLD AUTO: 7 K/UL — SIGNIFICANT CHANGE UP (ref 3.8–10.5)
WBC UR QL: SIGNIFICANT CHANGE UP /HPF (ref 0–5)

## 2023-07-12 PROCEDURE — 99285 EMERGENCY DEPT VISIT HI MDM: CPT

## 2023-07-12 PROCEDURE — 71046 X-RAY EXAM CHEST 2 VIEWS: CPT | Mod: 26

## 2023-07-12 RX ORDER — SODIUM CHLORIDE 9 MG/ML
1000 INJECTION INTRAMUSCULAR; INTRAVENOUS; SUBCUTANEOUS ONCE
Refills: 0 | Status: COMPLETED | OUTPATIENT
Start: 2023-07-12 | End: 2023-07-12

## 2023-07-12 RX ADMIN — SODIUM CHLORIDE 1000 MILLILITER(S): 9 INJECTION INTRAMUSCULAR; INTRAVENOUS; SUBCUTANEOUS at 23:12

## 2023-07-12 NOTE — ED ADULT NURSE NOTE - OBJECTIVE STATEMENT
patient present with c/o neck pain describes as pressure, nausea, dizziness on/off for 3 days, denies headache, chest pain, palpitation.

## 2023-07-12 NOTE — ED ADULT TRIAGE NOTE - CHIEF COMPLAINT QUOTE
Pt c/o intermittent neck pressure radiating to head with nausea, dizziness, brain fog, visual: sometimes things appear more bright     Recent ENT appointment was normal

## 2023-07-12 NOTE — ED ADULT NURSE NOTE - NSFALLUNIVINTERV_ED_ALL_ED
Bed/Stretcher in lowest position, wheels locked, appropriate side rails in place/Call bell, personal items and telephone in reach/Instruct patient to call for assistance before getting out of bed/chair/stretcher/Non-slip footwear applied when patient is off stretcher/[place] to call system/Physically safe environment - no spills, clutter or unnecessary equipment/Purposeful proactive rounding/Room/bathroom lighting operational, light cord in reach

## 2023-07-13 DIAGNOSIS — R42 DIZZINESS AND GIDDINESS: ICD-10-CM

## 2023-07-13 DIAGNOSIS — I10 ESSENTIAL (PRIMARY) HYPERTENSION: ICD-10-CM

## 2023-07-13 DIAGNOSIS — Z29.9 ENCOUNTER FOR PROPHYLACTIC MEASURES, UNSPECIFIED: ICD-10-CM

## 2023-07-13 DIAGNOSIS — Z86.39 PERSONAL HISTORY OF OTHER ENDOCRINE, NUTRITIONAL AND METABOLIC DISEASE: ICD-10-CM

## 2023-07-13 PROBLEM — Z87.891 PERSONAL HISTORY OF NICOTINE DEPENDENCE: Chronic | Status: ACTIVE | Noted: 2023-02-11

## 2023-07-13 LAB
A1C WITH ESTIMATED AVERAGE GLUCOSE RESULT: 5.7 % — HIGH (ref 4–5.6)
CHOLEST SERPL-MCNC: 169 MG/DL — SIGNIFICANT CHANGE UP
CULTURE RESULTS: SIGNIFICANT CHANGE UP
ESTIMATED AVERAGE GLUCOSE: 117 MG/DL — HIGH (ref 68–114)
HDLC SERPL-MCNC: 77 MG/DL — SIGNIFICANT CHANGE UP
LIPID PNL WITH DIRECT LDL SERPL: 84 MG/DL — SIGNIFICANT CHANGE UP
NON HDL CHOLESTEROL: 92 MG/DL — SIGNIFICANT CHANGE UP
SPECIMEN SOURCE: SIGNIFICANT CHANGE UP
TRIGL SERPL-MCNC: 41 MG/DL — SIGNIFICANT CHANGE UP

## 2023-07-13 PROCEDURE — 99222 1ST HOSP IP/OBS MODERATE 55: CPT

## 2023-07-13 PROCEDURE — 70498 CT ANGIOGRAPHY NECK: CPT | Mod: 26,MG

## 2023-07-13 PROCEDURE — 93306 TTE W/DOPPLER COMPLETE: CPT | Mod: 26

## 2023-07-13 PROCEDURE — 99223 1ST HOSP IP/OBS HIGH 75: CPT

## 2023-07-13 PROCEDURE — 70496 CT ANGIOGRAPHY HEAD: CPT | Mod: 26,MG

## 2023-07-13 PROCEDURE — G1004: CPT

## 2023-07-13 RX ORDER — TRIAMTERENE/HYDROCHLOROTHIAZID 75 MG-50MG
0 TABLET ORAL
Qty: 0 | Refills: 1 | DISCHARGE

## 2023-07-13 RX ORDER — ATORVASTATIN CALCIUM 80 MG/1
80 TABLET, FILM COATED ORAL AT BEDTIME
Refills: 0 | Status: DISCONTINUED | OUTPATIENT
Start: 2023-07-13 | End: 2023-07-14

## 2023-07-13 RX ORDER — ENOXAPARIN SODIUM 100 MG/ML
40 INJECTION SUBCUTANEOUS EVERY 24 HOURS
Refills: 0 | Status: DISCONTINUED | OUTPATIENT
Start: 2023-07-13 | End: 2023-07-14

## 2023-07-13 RX ORDER — ASPIRIN/CALCIUM CARB/MAGNESIUM 324 MG
162 TABLET ORAL ONCE
Refills: 0 | Status: COMPLETED | OUTPATIENT
Start: 2023-07-13 | End: 2023-07-13

## 2023-07-13 RX ORDER — ROSUVASTATIN CALCIUM 5 MG/1
0 TABLET ORAL
Qty: 0 | Refills: 2 | DISCHARGE

## 2023-07-13 RX ORDER — SODIUM CHLORIDE 9 MG/ML
1000 INJECTION INTRAMUSCULAR; INTRAVENOUS; SUBCUTANEOUS
Refills: 0 | Status: DISCONTINUED | OUTPATIENT
Start: 2023-07-13 | End: 2023-07-14

## 2023-07-13 RX ORDER — ATENOLOL 25 MG/1
50 TABLET ORAL DAILY
Refills: 0 | Status: DISCONTINUED | OUTPATIENT
Start: 2023-07-13 | End: 2023-07-14

## 2023-07-13 RX ADMIN — Medication 162 MILLIGRAM(S): at 01:51

## 2023-07-13 RX ADMIN — SODIUM CHLORIDE 125 MILLILITER(S): 9 INJECTION INTRAMUSCULAR; INTRAVENOUS; SUBCUTANEOUS at 21:11

## 2023-07-13 NOTE — PATIENT PROFILE ADULT - STATED REASON FOR ADMISSION
Subjective   Patient ID: Roger is a 3 month old male.    Chief Complaint   Patient presents with   • Cough     x 5 days, more at night       HPI: Roger is a 3 mon FT infant who presents with concerns of cough x5 days.  No fevers.  Good PO intake.  Normal UOP.  No difficulty breathing.  Increased congestion.  (+) sick contact with other family members sick with colds in the home.  Family is visiting from [place].  Presents today for further evaluation.     No past medical history on file.    MEDICATIONS:  Current Outpatient Medications   Medication Sig   • desonide (DESOWEN) 0.05 % ointment      No current facility-administered medications for this visit.        ALLERGIES:  ALLERGIES:  No Known Allergies    PAST SURGICAL HISTORY:  No past surgical history on file.    FAMILY HISTORY:  No family history on file.    SOCIAL HISTORY:  Social History     Tobacco Use   • Smoking status: Not on file   Substance Use Topics   • Alcohol use: Not on file   • Drug use: Not on file         Review of Systems   Constitutional: Negative for fever.   HENT: Positive for congestion and sneezing.    Eyes: Negative.    Respiratory: Positive for cough.    Cardiovascular: Negative.    Gastrointestinal: Negative.    Genitourinary: Negative.        Visit Vitals  Temp 98.6 °F (37 °C)   Resp 26   Ht 24.53\" (62.3 cm)   Wt 6.005 kg (13 lb 3.8 oz)   SpO2 100%   BMI 15.47 kg/m²       Physical Exam  Constitutional:       General: He is active.   HENT:      Head: Normocephalic. Anterior fontanelle is flat.      Right Ear: Tympanic membrane, ear canal and external ear normal.      Left Ear: Tympanic membrane, ear canal and external ear normal.      Nose: Congestion and rhinorrhea present.      Mouth/Throat:      Mouth: Mucous membranes are moist.   Eyes:      Extraocular Movements: Extraocular movements intact.      Pupils: Pupils are equal, round, and reactive to light.   Neck:      Musculoskeletal: Normal range of motion.   Cardiovascular:       Rate and Rhythm: Normal rate and regular rhythm.      Pulses: Normal pulses.      Heart sounds: Normal heart sounds. No murmur. No friction rub. No gallop.    Pulmonary:      Comments: Diffuse coarse breath sounds throughout with good aeration.  No retractions, wheezes or tracheal tugging.    Abdominal:      General: Abdomen is flat.      Palpations: Abdomen is soft.   Skin:     General: Skin is warm.      Capillary Refill: Capillary refill takes less than 2 seconds.   Neurological:      Mental Status: He is alert.         ASSESSMENT/ PLAN: 3 mon boy with bronchiolitis, mild     1. Bronchiolitis, mild  Patient is stable, with no signs of resp distress.  RR 26 O2 sat 100% RA.   Advised to continue supportive care with frequent suctioning.    Anticipatory guidance given regarding signs and symptoms to monitor for and indications when to return to clinic.   Follow-up with Pediatrician as needed.         FOLLOW-UP:  No follow-ups on file.     Plan of care and anticipatory guidance discussed with patient/parents at bedside.  Parents displayed good understanding of plan of care.    Dee Dee Combs MD       Neck pressure going to her head. Nausea and dizziness. Light sensitivity Neck pressure going to her head. Nausea and dizziness.

## 2023-07-13 NOTE — ED PROVIDER NOTE - NSICDXPASTMEDICALHX_GEN_ALL_CORE_FT
PAST MEDICAL HISTORY:  Former smoker     H/O goiter     HTN (hypertension)     Obstructive sleep apnea

## 2023-07-13 NOTE — ED PROVIDER NOTE - PHYSICAL EXAMINATION
I have reviewed the triage vital signs.  Const: AAOx3, in NAD  Eyes: no conjunctival injection  HENT: NCAT, Neck supple, oral mucosa moist  CV: RRR, +S1, S2  Resp: CTAB, no respiratory distress  GI: Abdomen soft, NTND, no guarding  : no CVA tenderness  Extremities: No peripheral edema,  2+ radial and DP pulses  Skin: Warm, well perfused, no rash  MSK: No gross deformities appreciated  Neuro: CN2-12 grossly intact, MS +5/5 in UE and LE BL, finger to nose smooth and rapid, gross sensation intact in UE and LE BL, gait smooth and coordinated, negative rhomberg, negative pronator drift   Psych: Appropriate mood and affect

## 2023-07-13 NOTE — H&P ADULT - NSHPPHYSICALEXAM_GEN_ALL_CORE
Vital Signs Last 24 Hrs  T(C): 37 (13 Jul 2023 01:15), Max: 37.2 (12 Jul 2023 17:50)  T(F): 98.6 (13 Jul 2023 01:15), Max: 98.9 (12 Jul 2023 17:50)  HR: 63 (13 Jul 2023 01:15) (60 - 77)  BP: 118/68 (13 Jul 2023 01:15) (118/68 - 155/80)  BP(mean): --  RR: 18 (13 Jul 2023 01:15) (18 - 18)  SpO2: 98% (13 Jul 2023 01:15) (98% - 100%)    Parameters below as of 13 Jul 2023 01:15  Patient On (Oxygen Delivery Method): room air    PHYSICAL EXAM:  GENERAL: NAD, speaks in full sentences, no signs of respiratory distress  HEAD:  Atraumatic, Normocephalic  EYES: EOMI, PERRLA, conjunctiva and sclera clear  NECK: Supple, No JVD  CHEST/LUNG: Clear to auscultation bilaterally; No wheeze; No crackles; No accessory muscles used  HEART: Regular rate and rhythm; No murmurs;   ABDOMEN: Soft, Nontender, Nondistended; Bowel sounds present; No guarding  EXTREMITIES:  2+ Peripheral Pulses, No cyanosis or edema  PSYCH: AAOx3  NEUROLOGY: non-focal  SKIN: No rashes or lesions Vital Signs Last 24 Hrs  T(C): 37 (13 Jul 2023 01:15), Max: 37.2 (12 Jul 2023 17:50)  T(F): 98.6 (13 Jul 2023 01:15), Max: 98.9 (12 Jul 2023 17:50)  HR: 63 (13 Jul 2023 01:15) (60 - 77)  BP: 118/68 (13 Jul 2023 01:15) (118/68 - 155/80)  BP(mean): --  RR: 18 (13 Jul 2023 01:15) (18 - 18)  SpO2: 98% (13 Jul 2023 01:15) (98% - 100%)    Parameters below as of 13 Jul 2023 01:15  Patient On (Oxygen Delivery Method): room air    PHYSICAL EXAM:  GENERAL: NAD, speaks in full sentences, no signs of respiratory distress  HEAD:  Atraumatic, Normocephalic  EYES: EOMI, PERRLA, conjunctiva and sclera clear  NECK: Supple, No JVD  CHEST/LUNG: Clear to auscultation bilaterally; No wheeze; No crackles; No accessory muscles used  HEART: Regular rate and rhythm; No murmurs;   ABDOMEN: Soft, Nontender, Nondistended; Bowel sounds present; No guarding  EXTREMITIES:  2+ Peripheral Pulses, No cyanosis or edema  PSYCH: AAOx3  NEUROLOGY: non-focal.  SKIN: No rashes or lesions

## 2023-07-13 NOTE — PATIENT PROFILE ADULT - FALL HARM RISK - HARM RISK INTERVENTIONS
Assistance with ambulation/Assistance OOB with selected safe patient handling equipment/Communicate Risk of Fall with Harm to all staff/Discuss with provider need for PT consult/Monitor gait and stability/Provide patient with walking aids - walker, cane, crutches/Reinforce activity limits and safety measures with patient and family/Sit up slowly, dangle for a short time, stand at bedside before walking/Tailored Fall Risk Interventions/Visual Cue: Yellow wristband and red socks/Bed in lowest position, wheels locked, appropriate side rails in place/Call bell, personal items and telephone in reach/Instruct patient to call for assistance before getting out of bed or chair/Non-slip footwear when patient is out of bed/[place] to call system/Physically safe environment - no spills, clutter or unnecessary equipment/Purposeful Proactive Rounding/Room/bathroom lighting operational, light cord in reach

## 2023-07-13 NOTE — H&P ADULT - ATTENDING COMMENTS
78 year old woman with PMH of HTN who comes in complaining of some episodes of dizziness over the last few days. NO chest pain, palpitations, SOB. She has been in the hot sun helping clean out his late brother's house and has been sweating a lot.   Code stroke called in the ED.     Vital Signs Last 24 Hrs  T(C): 36.6 (13 Jul 2023 07:30), Max: 37.2 (12 Jul 2023 17:50)  T(F): 97.9 (13 Jul 2023 07:30), Max: 98.9 (12 Jul 2023 17:50)  HR: 69 (13 Jul 2023 07:30) (60 - 77)  BP: 141/76 (13 Jul 2023 07:30) (118/68 - 155/80)  BP(mean): 82 (13 Jul 2023 04:58) (82 - 82)  RR: 17 (13 Jul 2023 07:30) (17 - 18)  SpO2: 99% (13 Jul 2023 07:30) (98% - 100%)    Labs reviewed  - unremarkable     Imaging also grossly unremarkable     Impression  78 year old woman with hx as above - comes in with acute onset dizziness with no associated symptoms which could have been orthostatic in origin but IVF was given before orthostatic VS could be done.  Other consideration could be cardiac though EKG and trop are unremarkable.  Less likely to be neurologic in origin     A/P  - Presyncope   r/o cardiac origin    - Admit to tele  - Serial troponin, EKG  - Orthostatic negative  - ECHO in AM  - Cardiology consult in AM

## 2023-07-13 NOTE — H&P ADULT - HISTORY OF PRESENT ILLNESS
This is a 78y old F coming from home and ambulating w/o limitations. PMHx HTN and LANDY. Patient has been experiencing pressure that is isolated to her R lower occipital area since 23, it is intermittent and the pressure increases on extension of her neck. She denies pain.  Patient has been more active than usual for the past week, she is cleaning out her  brother's apartment. She has been experiencing nausea since  but denies vomitting. Patient felt lightheaded since  on prolonged standing during the day which she believes is due to dehydration. She denies spinning of the room, loss of consciousness, headache, migraines, tingling sensation, numbness, speech abnormalities, fever, seizures,chest pain, dyspnea, palpitations, orthopnea, swelling of legs. This is a 78y old F coming from home and ambulating w/o limitations. PMHx HTN and LANDY. Patient has been experiencing pressure that is isolated to her R lower occipital area since 23, it is intermittent and the pressure increases on extension of her neck. She denies pain in the region. Patient has been more active than usual for the past week, she is cleaning out her  brother's apartment. She has been experiencing nausea since  but denies vomiting Patient felt lightheaded since  on prolonged standing during the day which she believes is due to dehydration. She denies "spinning of the room", loss of consciousness, headache, migraines, tingling sensation, numbness, speech abnormalities, fever, seizures, chest pain, dyspnea, palpitations, orthopnea, swelling of legs.

## 2023-07-13 NOTE — H&P ADULT - NSHPREVIEWOFSYSTEMS_GEN_ALL_CORE
CONSTITUTIONAL: No fever, weight loss, or fatigue  RESPIRATORY: No cough, wheezing, chills or hemoptysis; No shortness of breath  CARDIOVASCULAR: No chest pain, palpitations, dizziness, or leg swelling  GASTROINTESTINAL: No abdominal pain. No nausea, vomiting, or hematemesis; No diarrhea or constipation. No melena or hematochezia.  GENITOURINARY: No dysuria or hematuria, urinary frequency  NEUROLOGICAL: No headaches, memory loss, loss of strength, numbness, or tremors  ENDOCRINE: No polyuria, polydipsia, or heat/cold intolerance  MUSCULOSKELETAL: No muscle aches, joint pains  HEME: no easy bruisability, no tender or enlarged lymph nodes  SKIN: No itching, burning, rashes, or lesions . CONSTITUTIONAL: No fever, weight loss, or fatigue  RESPIRATORY: No cough, wheezing, chills or hemoptysis; No shortness of breath  CARDIOVASCULAR: +dizziness. No chest pain, palpitations, or leg swelling  GASTROINTESTINAL: No abdominal pain. No nausea, vomiting, or hematemesis; No diarrhea or constipation. No melena or hematochezia.  GENITOURINARY: No dysuria or hematuria, urinary frequency  NEUROLOGICAL: No headaches, memory loss, loss of strength, numbness, or tremors  ENDOCRINE: No polyuria, polydipsia, or heat/cold intolerance  MUSCULOSKELETAL: No muscle aches, joint pains  HEME: no easy bruisability, no tender or enlarged lymph nodes  SKIN: No itching, burning, rashes, or lesions .

## 2023-07-13 NOTE — CHART NOTE - NSCHARTNOTEFT_GEN_A_CORE
NP Note discussed with primary attending.      Patient is a 78y old  Female who presents with a chief complaint of Dizziness (13 Jul 2023 10:48)      INTERVAL HPI/OVERNIGHT EVENTS: s/p CODE Stroke in ED on admission. CT head neg acute neuro pathology. CTA neck findings Cervical spine:Mild multilevel degenerative changes.  Diffuse mild spinal canal stenosis. Neuro consulted.   Pt seen and examined this morning in ED. Pt awake/alert, endorses feeling less pressure to back of head . Tolerating po. Pt denies numbness /decreased strength of bilat shoulders/upper extremities. Ambulating in ED without problems.   Sister at bedside.     MEDICATIONS  (STANDING):  atenolol  Tablet 50 milliGRAM(s) Oral daily  atorvastatin 80 milliGRAM(s) Oral at bedtime  enoxaparin Injectable 40 milliGRAM(s) SubCutaneous every 24 hours  sodium chloride 0.9%. 1000 milliLiter(s) (125 mL/Hr) IV Continuous <Continuous>    MEDICATIONS  (PRN):      __________________________________________________  REVIEW OF SYSTEMS:    CONSTITUTIONAL: No fever,   EYES: no acute visual disturbances  NECK: No pain or stiffness  RESPIRATORY: No cough; No shortness of breath  CARDIOVASCULAR: No chest pain, no palpitations  GASTROINTESTINAL: No pain. No nausea or vomiting; No diarrhea   NEUROLOGICAL: Less pressure at back of head. No  numbness, no tremors  MUSCULOSKELETAL: No joint pain, no muscle pain  GENITOURINARY: no dysuria, no frequency, no hesitancy  PSYCHIATRY: no depression , no anxiety  ALL OTHER  ROS negative        Vital Signs Last 24 Hrs  T(C): 36.8 (13 Jul 2023 11:35), Max: 37.2 (12 Jul 2023 17:50)  T(F): 98.2 (13 Jul 2023 11:35), Max: 98.9 (12 Jul 2023 17:50)  HR: 61 (13 Jul 2023 11:35) (60 - 77)  BP: 121/69 (13 Jul 2023 11:35) (118/68 - 155/80)  BP(mean): 82 (13 Jul 2023 04:58) (82 - 82)  RR: 17 (13 Jul 2023 11:35) (17 - 18)  SpO2: 98% (13 Jul 2023 11:35) (98% - 100%)    Parameters below as of 13 Jul 2023 11:35  Patient On (Oxygen Delivery Method): room air        ________________________________________________  PHYSICAL EXAM:  GENERAL: NAD  HEENT: Normocephalic;  conjunctivae and sclerae clear; moist mucous membranes;   NECK : supple  CHEST/LUNG: Clear to auscultation bilaterally with good air entry   HEART: S1 S2  regular; no murmurs, gallops or rubs  ABDOMEN: Soft, Nontender, Nondistended; Bowel sounds present  EXTREMITIES: no cyanosis; no edema; no calf tenderness  SKIN: warm and dry;   NERVOUS SYSTEM:  Awake and alert; no new deficits    _________________________________________________  LABS:                        12.8   7.00  )-----------( 226      ( 12 Jul 2023 21:59 )             40.1     07-12    138  |  105  |  11  ----------------------------<  94  3.7   |  31  |  0.86    Ca    9.4      12 Jul 2023 21:59    TPro  7.8  /  Alb  3.4<L>  /  TBili  0.5  /  DBili  x   /  AST  18  /  ALT  19  /  AlkPhos  64  07-12    PT/INR - ( 12 Jul 2023 21:59 )   PT: 12.7 sec;   INR: 1.07 ratio         PTT - ( 12 Jul 2023 21:59 )  PTT:31.8 sec  Urinalysis Basic - ( 12 Jul 2023 21:59 )    Color: x / Appearance: x / SG: x / pH: x  Gluc: 94 mg/dL / Ketone: x  / Bili: x / Urobili: x   Blood: x / Protein: x / Nitrite: x   Leuk Esterase: x / RBC: x / WBC x   Sq Epi: x / Non Sq Epi: x / Bacteria: x      CAPILLARY BLOOD GLUCOSE      RADIOLOGY & ADDITIONAL TESTS:    < from: CT Angio Neck w/ IV Cont (07.13.23 @ 00:09) >    IMPRESSION:  NONCONTRAST HEAD CT SCAN: No CT evidence of acute intracranial pathology.    Follow-up MRI may be obtained for further evaluation.    CT ANGIOGRAPHY NECK:  1.  Patent cervical vasculature.  Mild atherosclerotic changes of the   right carotid bulb.  No hemodynamically significant carotid stenosis   using NASCET criteria.  No flow-limiting vertebralartery stenosis.  No   evidence of dissection.  2.  Diffusely heterogeneous thyroid with multiple nodules measuring up to   1.5 cm.  If these have not been previously characterized, a nonemergent   thyroid ultrasound may be obtained as an outpatient.    CT ANGIOGRAPHY BRAIN:  1. The left superior cerebellar artery is grossly patent at its origin   but is not well visualized distally.  This may be due to technical   factors however underlying stenosis or distal occlusion is not excluded.    Otherwise, there is no major vessel occlusion, stenosis or aneurysm at   the [place].  2.  No evidence of dural venous sinus thrombosis or an arteriovenous   malformation.    < end of copied text >    < from: Xray Chest 2 Views PA/Lat (07.12.23 @ 22:43) >    Impression:    No acute pulmonary disease.    < end of copied text >    Consultant(s) Notes Reviewed:   YES    Care Discussed with Consultants :   ------------------  ASSESSMENT/PLAN    78y old F ambulating w/o limitations. PMHx HTN and LANDY. Patient has been experiencing pressure that is isolated to her R lower occipital area since 7/9/23 (sunday), it is intermittent and increases on extension of her neck. She denies pain.  Patient has been more active than usual for the past week, she is cleaning out an apartment. She also complains of lightheadedness and nausea which she believes is due to her increased activity and dehydration. The patient is being admitted for neck pain and dizziness to r/o stroke given her family history vs. cervical spine stenosis vs. dehydration.     1. Dizziness    -Start NS 125ml/hr    -Monitor Orthostatic BP -->> negative     -F/u Echo with bubble study    -F/u Neuro consult    -      Plan of care was discussed with patient and /or primary care giver; all questions and concerns were addressed and care was aligned with patient's wishes. NP Note discussed with primary attending.      Patient is a 78y old  Female who presents with a chief complaint of Dizziness (13 Jul 2023 10:48)      INTERVAL HPI/OVERNIGHT EVENTS: s/p CODE Stroke in ED on admission. CT head neg acute neuro pathology. CTA neck findings Cervical spine:Mild multilevel degenerative changes.  Diffuse mild spinal canal stenosis. Neuro consulted.   Pt seen and examined this morning in ED. Pt awake/alert, endorses feeling less pressure to back of head . Tolerating po. Pt denies numbness /decreased strength of bilat shoulders/upper extremities. Ambulating in ED without problems.   Sister at bedside.     MEDICATIONS  (STANDING):  atenolol  Tablet 50 milliGRAM(s) Oral daily  atorvastatin 80 milliGRAM(s) Oral at bedtime  enoxaparin Injectable 40 milliGRAM(s) SubCutaneous every 24 hours  sodium chloride 0.9%. 1000 milliLiter(s) (125 mL/Hr) IV Continuous <Continuous>    MEDICATIONS  (PRN):      __________________________________________________  REVIEW OF SYSTEMS:    CONSTITUTIONAL: No fever,   EYES: no acute visual disturbances  NECK: No pain or stiffness  RESPIRATORY: No cough; No shortness of breath  CARDIOVASCULAR: No chest pain, no palpitations  GASTROINTESTINAL: No pain. No nausea or vomiting; No diarrhea   NEUROLOGICAL: Less pressure at back of head. No  numbness, no tremors  MUSCULOSKELETAL: No joint pain, no muscle pain  GENITOURINARY: no dysuria, no frequency, no hesitancy  PSYCHIATRY: no depression , no anxiety  ALL OTHER  ROS negative        Vital Signs Last 24 Hrs  T(C): 36.8 (13 Jul 2023 11:35), Max: 37.2 (12 Jul 2023 17:50)  T(F): 98.2 (13 Jul 2023 11:35), Max: 98.9 (12 Jul 2023 17:50)  HR: 61 (13 Jul 2023 11:35) (60 - 77)  BP: 121/69 (13 Jul 2023 11:35) (118/68 - 155/80)  BP(mean): 82 (13 Jul 2023 04:58) (82 - 82)  RR: 17 (13 Jul 2023 11:35) (17 - 18)  SpO2: 98% (13 Jul 2023 11:35) (98% - 100%)    Parameters below as of 13 Jul 2023 11:35  Patient On (Oxygen Delivery Method): room air        ________________________________________________  PHYSICAL EXAM:  GENERAL: NAD  HEENT: Normocephalic;  conjunctivae and sclerae clear; moist mucous membranes;   NECK : supple  CHEST/LUNG: Clear to auscultation bilaterally with good air entry   HEART: S1 S2  regular; no murmurs, gallops or rubs  ABDOMEN: Soft, Nontender, Nondistended; Bowel sounds present  EXTREMITIES: no cyanosis; no edema; no calf tenderness  SKIN: warm and dry;   NERVOUS SYSTEM:  Awake and alert; no new deficits    _________________________________________________  LABS:                        12.8   7.00  )-----------( 226      ( 12 Jul 2023 21:59 )             40.1     07-12    138  |  105  |  11  ----------------------------<  94  3.7   |  31  |  0.86    Ca    9.4      12 Jul 2023 21:59    TPro  7.8  /  Alb  3.4<L>  /  TBili  0.5  /  DBili  x   /  AST  18  /  ALT  19  /  AlkPhos  64  07-12    PT/INR - ( 12 Jul 2023 21:59 )   PT: 12.7 sec;   INR: 1.07 ratio         PTT - ( 12 Jul 2023 21:59 )  PTT:31.8 sec  Urinalysis Basic - ( 12 Jul 2023 21:59 )    Color: x / Appearance: x / SG: x / pH: x  Gluc: 94 mg/dL / Ketone: x  / Bili: x / Urobili: x   Blood: x / Protein: x / Nitrite: x   Leuk Esterase: x / RBC: x / WBC x   Sq Epi: x / Non Sq Epi: x / Bacteria: x      CAPILLARY BLOOD GLUCOSE      RADIOLOGY & ADDITIONAL TESTS:    < from: CT Angio Neck w/ IV Cont (07.13.23 @ 00:09) >    IMPRESSION:  NONCONTRAST HEAD CT SCAN: No CT evidence of acute intracranial pathology.    Follow-up MRI may be obtained for further evaluation.    CT ANGIOGRAPHY NECK:  1.  Patent cervical vasculature.  Mild atherosclerotic changes of the   right carotid bulb.  No hemodynamically significant carotid stenosis   using NASCET criteria.  No flow-limiting vertebralartery stenosis.  No   evidence of dissection.  2.  Diffusely heterogeneous thyroid with multiple nodules measuring up to   1.5 cm.  If these have not been previously characterized, a nonemergent   thyroid ultrasound may be obtained as an outpatient.    CT ANGIOGRAPHY BRAIN:  1. The left superior cerebellar artery is grossly patent at its origin   but is not well visualized distally.  This may be due to technical   factors however underlying stenosis or distal occlusion is not excluded.    Otherwise, there is no major vessel occlusion, stenosis or aneurysm at   the [place].  2.  No evidence of dural venous sinus thrombosis or an arteriovenous   malformation.    < end of copied text >    < from: Xray Chest 2 Views PA/Lat (07.12.23 @ 22:43) >    Impression:    No acute pulmonary disease.    < end of copied text >    Consultant(s) Notes Reviewed:   YES    Care Discussed with Consultants :   ------------------  ASSESSMENT/PLAN    78y old F ambulating w/o limitations. PMHx HTN and LANDY. Patient has been experiencing pressure that is isolated to her R lower occipital area since 7/9/23 (sunday), it is intermittent and increases on extension of her neck. She denies pain.  Patient has been more active than usual for the past week, she is cleaning out an apartment. She also complains of lightheadedness and nausea which she believes is due to her increased activity and dehydration. The patient is being admitted for neck pain and dizziness to r/o stroke given her family history vs. cervical spine stenosis vs. dehydration.     1. Dizziness    -Start NS 125ml/hr    -Monitor Orthostatic BP -->> negative     -F/u Echo with bubble study    -F/u Neuro consult    -      Plan of care was discussed with patient and /or primary care giver; all questions and concerns were addressed and care was aligned with patient's wishes.    Attending Addendum:  Patient seen and examined 7/13/23 around 5 PM  Patient with Hx HTN admitted with neck and posterior occipital pain underwent Code stroke in ED with all imaging negative as well as symptoms not suggesting stroke  Patient with improved dizziness and mild residual symptoms able to ambulate independently   Continue Fluids overnight as dehydration may have contributed; Meclizine routine x 24 hrs  Neuro consulted; d/w Dr. Fink who does not believe stroke and possible Orthostasis; Orthostatic BP done this afternoon negative  Patient reports being physically active past week with cleaning an apartment as well as under immense stress from passing aay of her Brother recently of lung cancer.   Patient also had recently visited an ENT for unrelated issue of some sticking sensation in throat after food and had scope and now resolved symptoms  Her symptoms appear to be either Labyrinthitis or Cervical Spondylosis  Follow up ECHO; D/C Plan AM with outpt Neuro and ENT follow up

## 2023-07-13 NOTE — CONSULT NOTE ADULT - ASSESSMENT
Dizziness/ lightheadedness in patient with Vital signs suggestive of orthostasis with falling short of definite Dx, drinks about 1L of water per day.  Will recommend to increase fluid intake and consider iv fluids.    pls call back with questions  dw Dr. Patrick

## 2023-07-13 NOTE — CONSULT NOTE ADULT - SUBJECTIVE AND OBJECTIVE BOX
***TEMPLATE ONLY***      Patient is a 78y old  Female who presents with a chief complaint of Dizziness (2023 02:25)      HPI:  This is a 78y old F coming from home and ambulating w/o limitations. PMHx HTN and LANDY. Patient has been experiencing pressure that is isolated to her R lower occipital area since 23, it is intermittent and the pressure increases on extension of her neck. She denies pain in the region. Patient has been more active than usual for the past week, she is cleaning out her  brother's apartment. She has been experiencing nausea since  but denies vomiting Patient felt lightheaded since  on prolonged standing during the day which she believes is due to dehydration. She denies "spinning of the room", loss of consciousness, headache, migraines, tingling sensation, numbness, speech abnormalities, fever, seizures, chest pain, dyspnea, palpitations, orthopnea, swelling of legs. (2023 02:25)         Neurological Review of Systems:  No difficulty with language.  No vision loss or double vision.  No dizziness, vertigo or new hearing loss.  No difficulty with speech or swallowing.  No focal weakness.  No focal sensory changes.  No numbness or tingling in the bilateral lower extremities.  No difficulty with balance.  No difficulty with ambulation.        MEDICATIONS  (STANDING):  atenolol  Tablet 50 milliGRAM(s) Oral daily  atorvastatin 80 milliGRAM(s) Oral at bedtime  enoxaparin Injectable 40 milliGRAM(s) SubCutaneous every 24 hours    MEDICATIONS  (PRN):    Allergies    penicillin (Rash)    Intolerances    adhesives (Hives)    PAST MEDICAL & SURGICAL HISTORY:  HTN (hypertension)      Obstructive sleep apnea      H/O goiter      Former smoker      Breast fibroadenoma        FAMILY HISTORY:  FH: HTN (hypertension) (Mother)    FH: type 2 diabetes (Mother)    FH: myocardial infarction (Mother)    FH: stroke (Father)      SOCIAL HISTORY: non smoker/ former smoker/ active smoker    Review of Systems:  Constitutional: No generalized weakness. No fevers or chills.                    Eyes, Ears, Mouth, Throat: No vision loss   Respiratory: No shortness of breath or cough.                                Cardiovascular: No chest pain or palpitations  Gastrointestinal: No nausea or vomiting.                                         Genitourinary: No urinary incontinence or burning on urination.  Musculoskeletal: No joint pain.                                                           Dermatologic: No rash.  Neurological: as per HPI                                                                      Psychiatric: No behavioral problems.  Endocrine: No known hypoglycemia.               Hematologic/Lymphatic: No easy bleeding.    O:  Vital Signs Last 24 Hrs  T(C): 36.6 (2023 07:30), Max: 37.2 (2023 17:50)  T(F): 97.9 (2023 07:30), Max: 98.9 (2023 17:50)  HR: 69 (2023 07:30) (60 - 77)  BP: 141/76 (2023 07:30) (118/68 - 155/80)  BP(mean): 82 (2023 04:58) (82 - 82)  RR: 17 (2023 07:30) (17 - 18)  SpO2: 99% (2023 07:30) (98% - 100%)    Parameters below as of 2023 07:30  Patient On (Oxygen Delivery Method): room air        General Exam:   General appearance: No acute distress                 Cardiovascular: Pedal dorsalis pulses intact bilaterally    Mental Status: Orientated to self, date and place.  Attention intact.  No dysarthria, aphasia or neglect.  Knowledge intact.  Registration intact.  Short and long term memory grossly intact.      Cranial Nerves: CN I - not tested.  PERRL, EOMI, VFF, no nystagmus or diplopia.  No APD.  Fundi not visualized.  CN V1-3 intact to light touch and pinprick.  No facial asymmetry.  Hearing intact to finger rub bilaterally.  Tongue, uvula and palate midline.  Sternocleidomastoid and Trapezius intact bilaterally.    Motor:   Tone: normal.                  Strength intact throughout  No pronator drift bilaterally                      No dysmetria on finger-nose-finger or heel-shin-heel  No truncal ataxia.  No resting, postural or action tremor.  No myoclonus.    Sensation: intact to light touch, pinprick, vibration and proprioception    Deep Tendon Reflexes: 1+ bilateral biceps, triceps, brachioradialis, knee and ankle  Toes flexor bilaterally    Gait: normal and stable.  Rhomberg -lianne.    Other:     LABS:                        12.8   7.00  )-----------( 226      ( 2023 21:59 )             40.1     07-12    138  |  105  |  11  ----------------------------<  94  3.7   |  31  |  0.86    Ca    9.4      2023 21:59    TPro  7.8  /  Alb  3.4<L>  /  TBili  0.5  /  DBili  x   /  AST  18  /  ALT  19  /  AlkPhos  64  07-12    PT/INR - ( 2023 21:59 )   PT: 12.7 sec;   INR: 1.07 ratio         PTT - ( 2023 21:59 )  PTT:31.8 sec  Urinalysis Basic - ( 2023 21:59 )    Color: x / Appearance: x / SG: x / pH: x  Gluc: 94 mg/dL / Ketone: x  / Bili: x / Urobili: x   Blood: x / Protein: x / Nitrite: x   Leuk Esterase: x / RBC: x / WBC x   Sq Epi: x / Non Sq Epi: x / Bacteria: x          RADIOLOGY & ADDITIONAL STUDIES:    EKG: < from: 12 Lead ECG (21 @ 16:25) >    Ventricular Rate 73 BPM    Atrial Rate 73 BPM    P-R Interval 164 ms    QRS Duration 78 ms    Q-T Interval 394 ms    QTC Calculation(Bazett) 434 ms    P Axis 49 degrees    R Axis -3 degrees    T Axis 36 degrees    Diagnosis Line Normal sinus rhythm  Normal ECG    Confirmed by TETE BRYANT MD ([id number]) on 2021 12:40:31 PM    < end of copied text >  < from: CT Angio Neck w/ IV Cont (23 @ 00:09) >  NONCONTRAST HEAD CT SCAN: No CT evidence of acute intracranial pathology.    Follow-up MRI may be obtained for further evaluation.    CT ANGIOGRAPHY NECK:  1.  Patent cervical vasculature.  Mild atherosclerotic changes of the   right carotid bulb.  No hemodynamically significant carotid stenosis   using NASCET criteria.  No flow-limiting vertebralartery stenosis.  No   evidence of dissection.  2.  Diffusely heterogeneous thyroid with multiple nodules measuring up to   1.5 cm.  If these have not been previously characterized, a nonemergent   thyroid ultrasound may be obtained as an outpatient.    CT ANGIOGRAPHY BRAIN:  1. The left superior cerebellar artery is grossly patent at its origin   but is not well visualized distally.  This may be due to technical   factors however underlying stenosis or distal occlusion is not excluded.    Otherwise, there is no major vessel occlusion, stenosis or aneurysm at   the [place].  2.  No evidence of dural venous sinus thrombosis or an arteriovenous   malformation.    < end of copied text >   ***TEMPLATE ONLY***      Patient is a 78y old  Female who presents with a chief complaint of Dizziness (2023 02:25)      HPI:  This is a 78y old F coming from home and ambulating w/o limitations. PMHx HTN and LANDY. Patient has been experiencing pressure that is isolated to her R shoulder since 23, it is intermittent . She denies pain in the region. Patient has been more active than usual for the past week, she is cleaning out her  brother's apartment. She has been experiencing nausea since  but denies vomiting Patient felt lightheaded since  on prolonged standing during the day which she believes is due to dehydration. She denies "spinning of the room", loss of consciousness, headache, migraines, tingling sensation, numbness, speech abnormalities, fever, seizures, chest pain, dyspnea, palpitations, orthopnea, swelling of legs. (2023 02:25)    She drinks 1L of water per day.    Neurological Review of Systems:  No difficulty with language.  No vision loss or double vision.  No vertigo or new hearing loss.  No difficulty with speech or swallowing.  No focal weakness.  No focal sensory changes.  No difficulty with balance.  No difficulty with ambulation.        MEDICATIONS  (STANDING):  atenolol  Tablet 50 milliGRAM(s) Oral daily  atorvastatin 80 milliGRAM(s) Oral at bedtime  enoxaparin Injectable 40 milliGRAM(s) SubCutaneous every 24 hours    MEDICATIONS  (PRN):    Allergies    penicillin (Rash)    Intolerances    adhesives (Hives)    PAST MEDICAL & SURGICAL HISTORY:  HTN (hypertension)      Obstructive sleep apnea      H/O goiter      Former smoker      Breast fibroadenoma        FAMILY HISTORY:  FH: HTN (hypertension) (Mother)    FH: type 2 diabetes (Mother)    FH: myocardial infarction (Mother)    FH: stroke (Father)      SOCIAL HISTORY: non smoker    Review of Systems:  Constitutional: No fevers                     Eyes, Ears, Mouth, Throat: No vision loss   Respiratory: No  cough.                                Cardiovascular: No chest pain  Gastrointestinal: No vomiting.                                         Genitourinary: No burning on urination.  Musculoskeletal: No joint pain.                                                           Dermatologic: No rash.  Neurological: as per HPI                                                                      Psychiatric: No behavioral problems.  Endocrine: No known hypoglycemia.               Hematologic/Lymphatic: No easy bleeding.    O:  Vital Signs Last 24 Hrs  T(C): 36.6 (2023 07:30), Max: 37.2 (2023 17:50)  T(F): 97.9 (2023 07:30), Max: 98.9 (2023 17:50)  HR: 69 (2023 07:30) (60 - 77)  BP: 141/76 (2023 07:30) (118/68 - 155/80)  BP(mean): 82 (2023 04:58) (82 - 82)  RR: 17 (2023 07:30) (17 - 18)  SpO2: 99% (2023 07:30) (98% - 100%)    Parameters below as of 2023 07:30  Patient On (Oxygen Delivery Method): room air        General Exam:   General appearance: No acute distress                 Cardiovascular: Pedal dorsalis pulses intact bilaterally    Mental Status: Orientated to self, date and place.  Attention intact.  No dysarthria, aphasia or neglect.  Knowledge intact.  Registration intact.  Short and long term memory grossly intact.      Cranial Nerves: CN I - not tested.  PERRL, EOMI, VFF, no nystagmus or diplopia.  No APD.  Fundi not visualized.  CN V1-3 intact to light touch.  No facial asymmetry.  Hearing intact to finger rub bilaterally.  Tongue, uvula and palate midline.  Sternocleidomastoid and Trapezius intact bilaterally.    Motor:   Tone: normal.                  Strength intact throughout  No pronator drift bilaterally                      No dysmetria on finger-nose-finger or heel-shin-heel  No truncal ataxia.  No resting, postural or action tremor.  No myoclonus.    Sensation: intact to light touch    Deep Tendon Reflexes: 1+ bilateral biceps, triceps, brachioradialis, knee and ankle  Toes flexor bilaterally    Gait: pt unable at this time    Other:     LABS:                        12.8   7.00  )-----------( 226      ( 2023 21:59 )             40.1     07-12    138  |  105  |  11  ----------------------------<  94  3.7   |  31  |  0.86    Ca    9.4      2023 21:59    TPro  7.8  /  Alb  3.4<L>  /  TBili  0.5  /  DBili  x   /  AST  18  /  ALT  19  /  AlkPhos  64  07-12    PT/INR - ( 2023 21:59 )   PT: 12.7 sec;   INR: 1.07 ratio         PTT - ( 2023 21:59 )  PTT:31.8 sec  Urinalysis Basic - ( 2023 21:59 )    Color: x / Appearance: x / SG: x / pH: x  Gluc: 94 mg/dL / Ketone: x  / Bili: x / Urobili: x   Blood: x / Protein: x / Nitrite: x   Leuk Esterase: x / RBC: x / WBC x   Sq Epi: x / Non Sq Epi: x / Bacteria: x          RADIOLOGY & ADDITIONAL STUDIES:    EKG: < from: 12 Lead ECG (21 @ 16:25) >    Ventricular Rate 73 BPM    Atrial Rate 73 BPM    P-R Interval 164 ms    QRS Duration 78 ms    Q-T Interval 394 ms    QTC Calculation(Bazett) 434 ms    P Axis 49 degrees    R Axis -3 degrees    T Axis 36 degrees    Diagnosis Line Normal sinus rhythm  Normal ECG    Confirmed by ANNETTE YADAV, TETE ([id number]) on 2021 12:40:31 PM    < end of copied text >  < from: CT Angio Neck w/ IV Cont (23 @ 00:09) > (images reviewed)  NONCONTRAST HEAD CT SCAN: No CT evidence of acute intracranial pathology.    Follow-up MRI may be obtained for further evaluation.    CT ANGIOGRAPHY NECK:  1.  Patent cervical vasculature.  Mild atherosclerotic changes of the   right carotid bulb.  No hemodynamically significant carotid stenosis   using NASCET criteria.  No flow-limiting vertebralartery stenosis.  No   evidence of dissection.  2.  Diffusely heterogeneous thyroid with multiple nodules measuring up to   1.5 cm.  If these have not been previously characterized, a nonemergent   thyroid ultrasound may be obtained as an outpatient.    CT ANGIOGRAPHY BRAIN:  1. The left superior cerebellar artery is grossly patent at its origin   but is not well visualized distally.  This may be due to technical   factors however underlying stenosis or distal occlusion is not excluded.    Otherwise, there is no major vessel occlusion, stenosis or aneurysm at   the [place].  2.  No evidence of dural venous sinus thrombosis or an arteriovenous   malformation.    < end of copied text >

## 2023-07-13 NOTE — H&P ADULT - PROBLEM SELECTOR PLAN 1
p/w _ w/ last known normal at:  code stroke in ED with NIHSS _  ASA qd, high intensity statin qhs, plavix x21 days if true stroke  CT head, stroke protocol shows  Tele monitoring  F/U Echo with bubble study  Pt not rTPA candidate due to:  Monitor BP - allow permissive htn x24hr from last known normal  PT consult  Dysphagia screen:  Neuro checks q4  Neuro Consulted: p/w _ w/ last known normal at:  code stroke in ED with NIHSS _  ASA qd, high intensity statin qhs, plavix x21 days if true stroke  CT head, stroke protocol shows  Tele monitoring  F/U Echo with bubble study  Pt not rTPA candidate due to:  Monitor BP - allow permissive htn x24hr from last known normal  PT consult  Dysphagia screen:  Neuro checks q4  Neuro Consulted:    Cervical Spinal Stenosis:  CT Head shows diffuse mild cervical spinal stenosis.  f/u with neurology. p/w dizziness  code stroke in ED with NIHSS 0  ASA qd, high intensity statin qhs, can start plavix x21 days if true stroke  CT head, stroke protocol shows diffuse mild cervical spinal stenosis.  Tele monitoring  F/U Echo with bubble study  Pt not rTPA candidate due to: minor symptoms  Monitor BP - allow permissive htn x24hr from last known normal  PT consult  f/u Dysphagia screen:  Neuro checks q4  Neuro Consulted: Dr. Fink p/w dizziness  code stroke in ED with NIHSS 0  Less likely to be neurologic in origin  s/p ASA / statin in ED  CT head, stroke protocol shows diffuse mild cervical spinal stenosis.  Tele monitoring  F/U Echo with bubble study  PT consult  Neurology already consulted

## 2023-07-13 NOTE — H&P ADULT - PROBLEM SELECTOR PLAN 2
- Pt has a history of HTN.   - Will start pts home medication of X with parameters. - Pt has a history of HTN.   - Will start pts home medication of atenolol with parameters.

## 2023-07-13 NOTE — H&P ADULT - ASSESSMENT
78y old F ambulating w/o limitations. PMHx HTN and LANDY. Patient has been experiencing pressure that is isolated to her R lower occipital area since 7/9/23 (sunday), it is intermittent and the pressure increases on extension of her neck. She denies pain.  Patient has been more active than usual for the past week, she is cleaning out an apartment. She also complains of lightheadedness and nausea which she believes is due to her increased activity and dehydration. The patient is being admitted for neck pain and dizziness to r/o stroke given her family history vs. cervical spine stenosis vs. dehydration.  78y old F ambulating w/o limitations. PMHx HTN and LANDY. Patient has been experiencing pressure that is isolated to her R lower occipital area since 7/9/23 (sunday), it is intermittent and increases on extension of her neck. She denies pain.  Patient has been more active than usual for the past week, she is cleaning out an apartment. She also complains of lightheadedness and nausea which she believes is due to her increased activity and dehydration. The patient is being admitted for neck pain and dizziness to r/o stroke given her family history vs. cervical spine stenosis vs. dehydration.

## 2023-07-13 NOTE — H&P ADULT - PROBLEM SELECTOR PLAN 3
- Pt has a history of goiter.   - CT: Diffusely heterogeneous thyroid with multiple nodules measuring up to 1.5 cm in the right thyroid lobe

## 2023-07-13 NOTE — H&P ADULT - NSICDXFAMILYHX_GEN_ALL_CORE_FT
FAMILY HISTORY:  Father  Still living? No  FH: stroke, Age at diagnosis: Age Unknown    Mother  Still living? Unknown  FH: HTN (hypertension), Age at diagnosis: Age Unknown  FH: myocardial infarction, Age at diagnosis: Age Unknown  FH: type 2 diabetes, Age at diagnosis: Age Unknown

## 2023-07-13 NOTE — ED PROVIDER NOTE - PROGRESS NOTE DETAILS
DO Bernardino: pt reevaluated, reports feeling much better. CT results noted including CT ANGIOGRAPHY BRAIN:  1. The left superior cerebellar artery is grossly patent at its origin   but is not well visualized distally.  This may be due to technical   factors however underlying stenosis or distal occlusion is not excluded.    Recommended pt stay for MRI to eval for posterior CVA. Pt states she would like to think about it. Discussed at length with pt c/f CVA and possible permanent disability if not further evaluated. Will order for ASA in interim. Bernardino DO: pt agreeable to admission. Endorsed to neurologist/hospitalist/MAR

## 2023-07-13 NOTE — ED PROVIDER NOTE - OBJECTIVE STATEMENT
78F history of hypertension, LANDY, goiter, former smoker here with intermittent neck pain and dizziness over past week. States that sometimes things appear more "white/bright" but no double vision. Denies any speaking, swallowing, difficulty walking.  Denies any numbness, tingling, weakness.  No chest pain, palpitations shortness of breath.  Currently does not have symptoms.

## 2023-07-13 NOTE — ED PROVIDER NOTE - CLINICAL SUMMARY MEDICAL DECISION MAKING FREE TEXT BOX
Bernardino, DO: 78F here with episodes of neck pain and dizziness intermittently over past week. Currenlty asymptomatic. Neuro exam normal. DDx includes but not limited to CVA, cervical artery dissection, electrolyte derangement, no cardiac arryhthmia on EKG. Plan for labs, CTA head/neck, reeval

## 2023-07-14 ENCOUNTER — TRANSCRIPTION ENCOUNTER (OUTPATIENT)
Age: 78
End: 2023-07-14

## 2023-07-14 VITALS
HEART RATE: 73 BPM | DIASTOLIC BLOOD PRESSURE: 71 MMHG | TEMPERATURE: 99 F | SYSTOLIC BLOOD PRESSURE: 132 MMHG | OXYGEN SATURATION: 98 % | RESPIRATION RATE: 18 BRPM

## 2023-07-14 LAB
HCV AB S/CO SERPL IA: 0.12 S/CO — SIGNIFICANT CHANGE UP (ref 0–0.99)
HCV AB SERPL-IMP: SIGNIFICANT CHANGE UP

## 2023-07-14 PROCEDURE — 81001 URINALYSIS AUTO W/SCOPE: CPT

## 2023-07-14 PROCEDURE — 85730 THROMBOPLASTIN TIME PARTIAL: CPT

## 2023-07-14 PROCEDURE — 99239 HOSP IP/OBS DSCHRG MGMT >30: CPT | Mod: GC

## 2023-07-14 PROCEDURE — 93005 ELECTROCARDIOGRAM TRACING: CPT

## 2023-07-14 PROCEDURE — 83036 HEMOGLOBIN GLYCOSYLATED A1C: CPT

## 2023-07-14 PROCEDURE — G1004: CPT

## 2023-07-14 PROCEDURE — 71046 X-RAY EXAM CHEST 2 VIEWS: CPT

## 2023-07-14 PROCEDURE — 80053 COMPREHEN METABOLIC PANEL: CPT

## 2023-07-14 PROCEDURE — 86803 HEPATITIS C AB TEST: CPT

## 2023-07-14 PROCEDURE — 36415 COLL VENOUS BLD VENIPUNCTURE: CPT

## 2023-07-14 PROCEDURE — 70496 CT ANGIOGRAPHY HEAD: CPT | Mod: MG

## 2023-07-14 PROCEDURE — 87086 URINE CULTURE/COLONY COUNT: CPT

## 2023-07-14 PROCEDURE — 80061 LIPID PANEL: CPT

## 2023-07-14 PROCEDURE — 70498 CT ANGIOGRAPHY NECK: CPT | Mod: MG

## 2023-07-14 PROCEDURE — 93306 TTE W/DOPPLER COMPLETE: CPT

## 2023-07-14 PROCEDURE — 99285 EMERGENCY DEPT VISIT HI MDM: CPT | Mod: 25

## 2023-07-14 PROCEDURE — 84484 ASSAY OF TROPONIN QUANT: CPT

## 2023-07-14 PROCEDURE — 70450 CT HEAD/BRAIN W/O DYE: CPT | Mod: MG

## 2023-07-14 PROCEDURE — 85025 COMPLETE CBC W/AUTO DIFF WBC: CPT

## 2023-07-14 PROCEDURE — 85610 PROTHROMBIN TIME: CPT

## 2023-07-14 RX ORDER — ATENOLOL 25 MG/1
1 TABLET ORAL
Qty: 0 | Refills: 1 | DISCHARGE

## 2023-07-14 RX ORDER — MECLIZINE HCL 12.5 MG
1 TABLET ORAL
Qty: 90 | Refills: 0
Start: 2023-07-14 | End: 2023-08-12

## 2023-07-14 RX ADMIN — ATENOLOL 50 MILLIGRAM(S): 25 TABLET ORAL at 06:12

## 2023-07-14 RX ADMIN — ENOXAPARIN SODIUM 40 MILLIGRAM(S): 100 INJECTION SUBCUTANEOUS at 06:12

## 2023-07-14 NOTE — DISCHARGE NOTE PROVIDER - ATTENDING DISCHARGE PHYSICAL EXAMINATION:
Patient admitted with dizziness admitted with concern for CVA symptoms resolved with hydration  No definitive evidence of stroke    P/E:  elderly female comfortable at rest, ambulate independently  Psych: AAO x3  Neuro: No gross focal deficits; Power and sensation intact  CVS: S1S2 present, regular, no edema  Resp: BLAE+, No wheeze or Rhonchi  GI: Soft, BS+, Non tender, non distended  Extr: No  calf tenderness B/L Lower extremities  Skin: Warm and moist without any rashes    Plan:  D/C Home on Meclizine prn only for severe dizziness  Extensively discussed likely etiology, increased fluid intake and f/u Neurology outpt if worsening symptoms  See detailed discharge instructions reviewed and edited

## 2023-07-14 NOTE — PROGRESS NOTE ADULT - SUBJECTIVE AND OBJECTIVE BOX
PGY-1 Progress Note discussed with attending    PAGER #: [[phone]] TILL 5:00 PM  PLEASE CONTACT ON CALL TEAM:  - On Call Team (Please refer to Rk) FROM 5:00 PM - 8:30PM  - Nightfloat Team FROM 8:30 -7:30 AM    INTERVAL HPI/OVERNIGHT EVENTS: Patient was seen at bedside. Patient claims that has been been having headache in the back of her head  a couple of days ago. This head      REVIEW OF SYSTEMS:  CONSTITUTIONAL: No fever, weight loss, or fatigue  RESPIRATORY: No cough, wheezing, chills or hemoptysis; No shortness of breath  CARDIOVASCULAR: No chest pain, palpitations, dizziness, or leg swelling  GASTROINTESTINAL: No abdominal pain. No nausea, vomiting, or hematemesis; No diarrhea or constipation. No melena or hematochezia.  GENITOURINARY: No dysuria or hematuria, urinary frequency  NEUROLOGICAL: No headaches, memory loss, loss of strength, numbness, or tremors  SKIN: No itching, burning, rashes, or lesions     Vital Signs Last 24 Hrs  T(C): 36.5 (14 Jul 2023 07:31), Max: 36.9 (13 Jul 2023 16:00)  T(F): 97.7 (14 Jul 2023 07:31), Max: 98.5 (13 Jul 2023 16:00)  HR: 60 (14 Jul 2023 07:31) (58 - 70)  BP: 114/60 (14 Jul 2023 07:31) (114/60 - 147/84)  BP(mean): 88 (13 Jul 2023 16:00) (88 - 88)  RR: 18 (14 Jul 2023 07:31) (17 - 19)  SpO2: 98% (14 Jul 2023 07:31) (98% - 100%)    Parameters below as of 14 Jul 2023 07:31  Patient On (Oxygen Delivery Method): room air        PHYSICAL EXAMINATION:  GENERAL: NAD, well built  HEAD:  Atraumatic, Normocephalic  EYES:  conjunctiva and sclera clear  NECK: Supple, No JVD, Normal thyroid  CHEST/LUNG: Clear to auscultation. Clear to percussion bilaterally; No rales, rhonchi, wheezing, or rubs  HEART: Regular rate and rhythm; No murmurs, rubs, or gallops  ABDOMEN: Soft, Nontender, Nondistended; Bowel sounds present  NERVOUS SYSTEM:  Alert & Oriented X3,    EXTREMITIES:  2+ Peripheral Pulses, No clubbing, cyanosis, or edema  SKIN: warm dry                          12.8   7.00  )-----------( 226      ( 12 Jul 2023 21:59 )             40.1     07-12    138  |  105  |  11  ----------------------------<  94  3.7   |  31  |  0.86    Ca    9.4      12 Jul 2023 21:59    TPro  7.8  /  Alb  3.4<L>  /  TBili  0.5  /  DBili  x   /  AST  18  /  ALT  19  /  AlkPhos  64  07-12    LIVER FUNCTIONS - ( 12 Jul 2023 21:59 )  Alb: 3.4 g/dL / Pro: 7.8 g/dL / ALK PHOS: 64 U/L / ALT: 19 U/L DA / AST: 18 U/L / GGT: x               PT/INR - ( 12 Jul 2023 21:59 )   PT: 12.7 sec;   INR: 1.07 ratio         PTT - ( 12 Jul 2023 21:59 )  PTT:31.8 sec    CAPILLARY BLOOD GLUCOSE      RADIOLOGY & ADDITIONAL TESTS:                   PGY-1 Progress Note discussed with attending    PAGER #: [[phone]] TILL 5:00 PM  PLEASE CONTACT ON CALL TEAM:  - On Call Team (Please refer to Rk) FROM 5:00 PM - 8:30PM  - Nightfloat Team FROM 8:30 -7:30 AM    INTERVAL HPI/OVERNIGHT EVENTS: Patient was seen at bedside. Patient claims that has been having headache in the back of her head for a couple of days. Headache is pressure like and moves in a "line" to the front of head per the patient. While having the episodes patient has sweating, and feeling weak. States that has never happened before. Patients denies slurred speech, or numbness in any extremity. Patient states that for the past couple of months have been exerting more than usual with limited water intake. On telemetry patient had nSVT and afif on 130s but back to sinus rythym. On moment of evaluation patient denies any SOB, chest pain, or fevers.      REVIEW OF SYSTEMS:  CONSTITUTIONAL: Patient claims weakness and fatigue   RESPIRATORY: No cough, wheezing, chills or hemoptysis; No shortness of breath  CARDIOVASCULAR: No chest pain, palpitations, dizziness, or leg swelling  GASTROINTESTINAL: No abdominal pain. No nausea, vomiting, or hematemesis; No diarrhea or constipation. No melena or hematochezia.  GENITOURINARY: No dysuria or hematuria, urinary frequency  NEUROLOGICAL: Patient complains of mild occipital headache  SKIN: No itching, burning, rashes, or lesions     Vital Signs Last 24 Hrs  T(C): 36.5 (14 Jul 2023 07:31), Max: 36.9 (13 Jul 2023 16:00)  T(F): 97.7 (14 Jul 2023 07:31), Max: 98.5 (13 Jul 2023 16:00)  HR: 60 (14 Jul 2023 07:31) (58 - 70)  BP: 114/60 (14 Jul 2023 07:31) (114/60 - 147/84)  BP(mean): 88 (13 Jul 2023 16:00) (88 - 88)  RR: 18 (14 Jul 2023 07:31) (17 - 19)  SpO2: 98% (14 Jul 2023 07:31) (98% - 100%)    Parameters below as of 14 Jul 2023 07:31  Patient On (Oxygen Delivery Method): room air        PHYSICAL EXAMINATION:  GENERAL: NAD  HEAD:  Atraumatic, Normocephalic  EYES:  conjunctiva and sclera clear, dry mucus membranes   NECK: Supple  CHEST/LUNG: Clear to auscultation.  No rales, rhonchi, wheezing, or rubs  HEART: Regular rate and rhythm; No murmurs, rubs, or gallops  ABDOMEN: Soft, Nontender, Nondistended; Bowel sounds present  NERVOUS SYSTEM:  Alert & Oriented X3,  5/5 on upper and lower extremities, no nasolabial fold deviation, sensation is intact in extremities   EXTREMITIES:  2+ Peripheral Pulses, No clubbing, cyanosis, or edema  SKIN: warm dry                          12.8   7.00  )-----------( 226      ( 12 Jul 2023 21:59 )             40.1     07-12    138  |  105  |  11  ----------------------------<  94  3.7   |  31  |  0.86    Ca    9.4      12 Jul 2023 21:59    TPro  7.8  /  Alb  3.4<L>  /  TBili  0.5  /  DBili  x   /  AST  18  /  ALT  19  /  AlkPhos  64  07-12    LIVER FUNCTIONS - ( 12 Jul 2023 21:59 )  Alb: 3.4 g/dL / Pro: 7.8 g/dL / ALK PHOS: 64 U/L / ALT: 19 U/L DA / AST: 18 U/L / GGT: x               PT/INR - ( 12 Jul 2023 21:59 )   PT: 12.7 sec;   INR: 1.07 ratio         PTT - ( 12 Jul 2023 21:59 )  PTT:31.8 sec    CAPILLARY BLOOD GLUCOSE      RADIOLOGY & ADDITIONAL TESTS:                   PGY-1 Progress Note discussed with attending    PAGER #: [[phone]] TILL 5:00 PM  PLEASE CONTACT ON CALL TEAM:  - On Call Team (Please refer to Rk) FROM 5:00 PM - 8:30PM  - Nightfloat Team FROM 8:30 -7:30 AM    INTERVAL HPI/OVERNIGHT EVENTS: Patient was seen at bedside. Patient claims that has been having headache in the back of her head for a couple of days. Headache is pressure like and moves in a "line" to the front of head per the patient. While having the episodes patient has sweating, and feeling weak. States that has never happened before. Patients denies slurred speech, or numbness in any extremity. Patient states that for the past couple of months have been exerting more than usual with limited water intake. On telemetry patient had nSVT on 130s but back to sinus rythym. On moment of evaluation patient denies any SOB, chest pain, or fevers.      REVIEW OF SYSTEMS:  CONSTITUTIONAL: Patient claims weakness and fatigue   RESPIRATORY: No cough, wheezing, chills or hemoptysis; No shortness of breath  CARDIOVASCULAR: No chest pain, palpitations, dizziness, or leg swelling  GASTROINTESTINAL: No abdominal pain. No nausea, vomiting, or hematemesis; No diarrhea or constipation. No melena or hematochezia.  GENITOURINARY: No dysuria or hematuria, urinary frequency  NEUROLOGICAL: Patient complains of mild occipital headache  SKIN: No itching, burning, rashes, or lesions     Vital Signs Last 24 Hrs  T(C): 36.5 (14 Jul 2023 07:31), Max: 36.9 (13 Jul 2023 16:00)  T(F): 97.7 (14 Jul 2023 07:31), Max: 98.5 (13 Jul 2023 16:00)  HR: 60 (14 Jul 2023 07:31) (58 - 70)  BP: 114/60 (14 Jul 2023 07:31) (114/60 - 147/84)  BP(mean): 88 (13 Jul 2023 16:00) (88 - 88)  RR: 18 (14 Jul 2023 07:31) (17 - 19)  SpO2: 98% (14 Jul 2023 07:31) (98% - 100%)    Parameters below as of 14 Jul 2023 07:31  Patient On (Oxygen Delivery Method): room air        PHYSICAL EXAMINATION:  GENERAL: NAD  HEAD:  Atraumatic, Normocephalic  EYES:  conjunctiva and sclera clear, dry mucus membranes   NECK: Supple  CHEST/LUNG: Clear to auscultation.  No rales, rhonchi, wheezing, or rubs  HEART: Regular rate and rhythm; No murmurs, rubs, or gallops  ABDOMEN: Soft, Nontender, Nondistended; Bowel sounds present  NERVOUS SYSTEM:  Alert & Oriented X3,  5/5 on upper and lower extremities, no nasolabial fold deviation, sensation is intact in extremities   EXTREMITIES:  2+ Peripheral Pulses, No clubbing, cyanosis, or edema  SKIN: warm dry                          12.8   7.00  )-----------( 226      ( 12 Jul 2023 21:59 )             40.1     07-12    138  |  105  |  11  ----------------------------<  94  3.7   |  31  |  0.86    Ca    9.4      12 Jul 2023 21:59    TPro  7.8  /  Alb  3.4<L>  /  TBili  0.5  /  DBili  x   /  AST  18  /  ALT  19  /  AlkPhos  64  07-12    LIVER FUNCTIONS - ( 12 Jul 2023 21:59 )  Alb: 3.4 g/dL / Pro: 7.8 g/dL / ALK PHOS: 64 U/L / ALT: 19 U/L DA / AST: 18 U/L / GGT: x               PT/INR - ( 12 Jul 2023 21:59 )   PT: 12.7 sec;   INR: 1.07 ratio         PTT - ( 12 Jul 2023 21:59 )  PTT:31.8 sec    CAPILLARY BLOOD GLUCOSE      RADIOLOGY & ADDITIONAL TESTS:

## 2023-07-14 NOTE — DISCHARGE NOTE NURSING/CASE MANAGEMENT/SOCIAL WORK - NSDCPEFALRISK_GEN_ALL_CORE
For information on Fall & Injury Prevention, visit: [email or web address] OR  [email or web address] OR  https://www.cdc.gov/steadi/patient.html

## 2023-07-14 NOTE — DISCHARGE NOTE PROVIDER - NSDCMRMEDTOKEN_GEN_ALL_CORE_FT
ATENOLOL 50 MG TABLET: TAKE 1 TABLET (50 MG TOTAL) BY MOUTH IN THE MORNING  meclizine 25 mg oral tablet: 1 tab(s) orally every 8 hours as needed for  dizziness  ROSUVASTATIN CALCIUM 10 MG TAB: TAKE 1 TABLET BY MOUTH EVERY DAY IN THE MORNING  TRIAMTERENE-HCTZ 37.5-25 MG TB: TAKE 1 TABLET BY MOUTH EVERY DAY IN THE MORNING

## 2023-07-14 NOTE — DISCHARGE NOTE PROVIDER - CARE PROVIDERS DIRECT ADDRESSES
,[email or web address],DirectAddress_Unknown [email or web address],DirectAddress_Unknown,DirectAddress_Unknown

## 2023-07-14 NOTE — DISCHARGE NOTE PROVIDER - CARE PROVIDER_API CALL
Dex Cabrera (MD)  Clinical Neurophysiology; Neurology; Sleep Medicine  [address], [address]nd [place] [address]  Phone: [phone]  Fax: [phone]  Follow Up Time:    Dex Cabrera)  Clinical Neurophysiology; Neurology; Sleep Medicine  [address], [address] [place] [address]  Phone: [phone]  Fax: [phone]  Follow Up Time:     Ewelina Ashton  Endocrinology/Metab/Diabetes  [address]  [place] [address]  Phone: [phone]  Fax: [phone]  Follow Up Time: 2 weeks   Dex Cabrera)  Clinical Neurophysiology; Neurology; Sleep Medicine  [address], [address] [place] [address]  Phone: [phone]  Fax: [phone]  Follow Up Time:     Ewelina Ashton  Endocrinology/Metab/Diabetes  [address]  [place] [address]  Phone: [phone]  Fax: [phone]  Follow Up Time: 2 weeks    Anna [place]  Internal Medicine  [address] [place] [address]  Phone: [phone]  Fax: [phone]  Follow Up Time: 1 week

## 2023-07-14 NOTE — PROGRESS NOTE ADULT - PROBLEM SELECTOR PLAN 1
-p/w dizziness  -code stroke in ED with NIHSS 0  -unlikely neurologic, likely due to decreased PO fluid intake  -EKG NSR, neg troponin, CXR no acute cardiopulmoary process  -s/p ASA / statin in ED  -CT head negative  -CTA head and neck negative  -ECHO showed normal EF, no PFO   -Tele monitoring  -Neuro consulted Dr. Fink  -Per neuro likely orthostatic  -pending PT consult -p/w dizziness  -code stroke in ED with NIHSS 0  -unlikely neurologic, likely due to decreased PO fluid intake  -EKG NSR, neg troponin, CXR no acute cardiopulmoary process  -s/p ASA / statin in ED  -CT head negative  -CTA head and neck negative  -ECHO showed normal EF, no PFO   -Tele monitoring  -Neuro consulted Dr. Fink  -Per neuro likely orthostatic  -to f/u with neuro outpatient

## 2023-07-14 NOTE — DISCHARGE NOTE PROVIDER - NSDCCPCAREPLAN_GEN_ALL_CORE_FT
PRINCIPAL DISCHARGE DIAGNOSIS  Diagnosis: Dizziness  Assessment and Plan of Treatment: You came into the hospital with neck pain and dizziness. You had extensive imaging including CT scan of the head showing no acute stoke or bleed in the brain. CT scan of the bloodflow in the neck and the head did not show no narrowing or acute abnormalities. You were started on aspirin and statin as precautionary measures. We checked your blood pressure and heart rate sitting and standing and these were normal, as this can be a common cause for dizziness. Neurology was consulted and recommended no further imaging. Echocardiogram of the heart was done showing normal function of the heart. YOUR DIZZINESS WAS NOT DUE TO STROKE. IT CAN BE DUE TO LACK OF FLUID INTAKE. PLEASE ENSURE ADEQUATE FLUID HYDRATION. DIZZINESS CAN BE DUE TO VERTIGO. PLEASE TAKE MECLIZINE 25MG EVERY 8 HOURS AS NEEDED FOR DIZZINESS. PLEASE FOLLOW WITH DR. CATALAN A NEUROLOGIST AS AN OUTPATIENT, INFORMATION WILL BE PROVIDED FOR YOU. PLEASE RETURN TO THE HOSPITAL IF YOU DEVELOP NUMBNESS, WEAKNESS, TROUBLE WITH SPEECH OR CONFUSION.   HTN: Patient with hx of htn, managed with home medications. DASH diet  Thyroid Goitre: Patient noted with goitre. CT scan showing Diffusely heterogeneous thyroid with multiple nodules measuring up to   1.5 cm. Outpatient thyroid ultrasound.      SECONDARY DISCHARGE DIAGNOSES  Diagnosis: Thyroid goiter  Assessment and Plan of Treatment: You have a history if thyroid goitre.  CT scan showing multiple thyroid nodules. PLEASE FOLLOW WITH YOUR PRIMARY CARE PROVIDER FOR FURTHER EVALUATION OF THIS FINDING. PLEASE FOLLOW WITH YOUR PROVIDER FOR THYROID ULTRASOUND.    Diagnosis: HTN (hypertension)  Assessment and Plan of Treatment: YOU HAVE HIGH BLOOD PRESSURE. PLEASE CONTINUE TO TAKE YOUR HOME MEDICATIONS OF ATENOLOL AND TRIAMTERENE- HCTZ AS PRESCRIBED. PLEASE CHECK YOUR BLOOD PRESSURE EVERYDAY. PLEASE FOLLOW WITH YOUR PRIMARY CARE PROVIDER.     PRINCIPAL DISCHARGE DIAGNOSIS  Diagnosis: Dizziness  Assessment and Plan of Treatment: You came into the hospital with neck pain and dizziness with tension back of head. ED was concerned about a storke.   You had extensive imaging including CT scan of the head showing no acute stoke or bleed in the brain. CT scan of the bloodflow in the neck and the head did not show no narrowing or acute abnormalities. You were started on aspirin and statin as precautionary measures. We checked your blood pressure and heart rate sitting and standing and these were normal, as this can be a common cause for dizziness. Neurology was consulted and recommended no further imaging as your p[resentation was not consistent with stroke.  Echocardiogram of the heart was done showing normal function of the heart.Your Heart pumping weas good. No evidence of any Heart failure or valve abnmormalities.    YOUR DIZZINESS WAS NOT DUE TO STROKE. IT CAN BE DUE TO LACK OF FLUID INTAKE AND POTENTIALY A VIRAL ILLNESS LEADING TO IMBALANCE IN THE EAR  PLEASE ENSURE ADEQUATE FLUID HYDRATION. DIZZINESS CAN BE DUE TO VERTIGO. PLEASE TAKE MECLIZINE 25MG EVERY 8 HOURS AS NEEDED ONLY FOR SEVERE  DIZZINESS. PLEASE FOLLOW WITH DR. CATALAN A NEUROLOGIST AS AN OUTPATIENT, INFORMATION WILL BE PROVIDED FOR YOU. PLEASE RETURN TO THE HOSPITAL IF YOU DEVELOP NUMBNESS, WEAKNESS, TROUBLE WITH SPEECH OR CONFUSION.   HTN: Patient with hx of htn, managed with home medications. DASH diet  Thyroid Goitre: Patient noted with goitre. CT scan showing Diffusely heterogeneous thyroid with multiple nodules measuring up to   1.5 cm. Outpatient thyroid ultrasound.      SECONDARY DISCHARGE DIAGNOSES  Diagnosis: Thyroid goiter  Assessment and Plan of Treatment: You have a history of thyroid goitre.  CT scan showing multiple thyroid nodules. PLEASE FOLLOW WITH YOUR PRIMARY CARE PROVIDER FOR FURTHER EVALUATION OF THIS FINDING. PLEASE FOLLOW WITH YOUR PROVIDER FOR THYROID ULTRASOUND. YOU MAY FOLLOW UP WITH AN ENDOCRINOLOGIST ALSO. IF YOU DO NOT HAVE ONE YOU MAY FOLLOW UP WITH DR. GUAMAN AT [facility] AS PER INFORMATION PROVIDED    Diagnosis: HTN (hypertension)  Assessment and Plan of Treatment: YOU HAVE HIGH BLOOD PRESSURE. PLEASE CONTINUE TO TAKE YOUR HOME MEDICATIONS OF ATENOLOL AND TRIAMTERENE- HCTZ AS PRESCRIBED. PLEASE CHECK YOUR BLOOD PRESSURE EVERYDAY. PLEASE FOLLOW WITH YOUR PRIMARY CARE PROVIDER.     PRINCIPAL DISCHARGE DIAGNOSIS  Diagnosis: Dizziness  Assessment and Plan of Treatment: You came into the hospital with neck pain and dizziness with tension back of head. ED was concerned about a storke.   You had extensive imaging including CT scan of the head showing no acute stoke or bleed in the brain. CT scan of the bloodflow in the neck and the head did not show no narrowing or acute abnormalities. You were started on aspirin and statin as precautionary measures. We checked your blood pressure and heart rate sitting and standing and these were normal, as this can be a common cause for dizziness. Neurology was consulted and recommended no further imaging as your p[resentation was not consistent with stroke.  Echocardiogram of the heart was done showing normal function of the heart.Your Heart pumping weas good. No evidence of any Heart failure or valve abnmormalities.    YOUR DIZZINESS WAS NOT DUE TO STROKE. IT CAN BE DUE TO LACK OF FLUID INTAKE AND POTENTIALY A VIRAL ILLNESS LEADING TO IMBALANCE IN THE EAR  PLEASE ENSURE ADEQUATE FLUID HYDRATION. DIZZINESS CAN BE DUE TO VERTIGO. PLEASE TAKE MECLIZINE 25MG EVERY 8 HOURS AS NEEDED ONLY FOR SEVERE  DIZZINESS. PLEASE FOLLOW WITH DR. CATALAN A NEUROLOGIST AS AN OUTPATIENT, INFORMATION WILL BE PROVIDED FOR YOU. PLEASE RETURN TO THE HOSPITAL IF YOU DEVELOP NUMBNESS, WEAKNESS, TROUBLE WITH SPEECH OR CONFUSION.   HTN: Patient with hx of htn, managed with home medications. DASH diet  Thyroid Goitre: Patient noted with goitre. CT scan showing Diffusely heterogeneous thyroid with multiple nodules measuring up to   1.5 cm. Outpatient thyroid ultrasound.      SECONDARY DISCHARGE DIAGNOSES  Diagnosis: Thyroid goiter  Assessment and Plan of Treatment: You have a history of thyroid goitre.  CT scan showing multiple thyroid nodules. PLEASE FOLLOW WITH YOUR PRIMARY CARE PROVIDER FOR FURTHER EVALUATION OF THIS FINDING. PLEASE FOLLOW WITH YOUR PROVIDER FOR THYROID ULTRASOUND. YOU MAY FOLLOW UP WITH AN ENDOCRINOLOGIST ALSO. IF YOU DO NOT HAVE ONE YOU MAY FOLLOW UP WITH DR. GUAMAN AT [facility] AS PER INFORMATION PROVIDED    Diagnosis: HTN (hypertension)  Assessment and Plan of Treatment: YOU HAVE HIGH BLOOD PRESSURE. PLEASE CONTINUE TO TAKE YOUR HOME MEDICATIONS OF ATENOLOL AND TRIAMTERENE- HCTZ AS PRESCRIBED. PLEASE CHECK YOUR BLOOD PRESSURE EVERYDAY. PLEASE FOLLOW WITH YOUR PRIMARY CARE PROVIDER.Ttarget blood pressure less than 130/80 mm hg  IF YOU HAVE SIGNIFICANT DIZZINESS YOUR PCP MAY CHANGE WATER PILL DYAZIDE TO AN ALTERNATE MEDICATION

## 2023-07-14 NOTE — PROGRESS NOTE ADULT - ASSESSMENT
78y old F with PMHx HTN and LANDY. Patient claims having experiencing pressure in the occipital area for some days. Pressure like and intermittent. Denies any slurred speech, numbness, but states some dizziness and weakness. On ED she was afebrile,  and hemodynamically stable. CT head, CT angio head and neck were negative. Troponin were negative, EGK was NSR. Echo with normal EF and no PFO. Pending PT. Patient admitted to r/o stroke.

## 2023-07-14 NOTE — DISCHARGE NOTE PROVIDER - NSDCFUADDINST_GEN_ALL_CORE_FT
ENSURE ADEQUATE HYDRATION; IF SIGNIFICANT DIZZINESS YOUR PCP MAY CONSIDER CHANGING DIURETIC TO ALTERNATE MEDICATION  IF PERSISTENT SYMPTOMS DESPITE MECLIZINE LASTING MORE THAN 2 WEEKS YOU M,AY FOLLOW UP WITH NEUROLOGIST AS PER INFORMATION PROVIDED OR ALTERNATIVELY WITH EAR, NOSE, THROAT DOCTOR YOU RECENTLY SAW

## 2023-07-14 NOTE — DISCHARGE NOTE NURSING/CASE MANAGEMENT/SOCIAL WORK - PATIENT PORTAL LINK FT
You can access the FollowMyHealth Patient Portal offered by [facility] by registering at the following website: [email or web address]. By joining [facility]’s FollowMyHealth portal, you will also be able to view your health information using other applications (apps) compatible with our system.

## 2023-07-14 NOTE — DISCHARGE NOTE PROVIDER - HOSPITAL COURSE
Patient is a 78y old F ambulating w/o limitations. PMHx HTN and LANDY. Patient has been experiencing pressure that is isolated to her R lower occipital area since 7/9/23 (sunday), it is intermittent and increases on extension of her neck. She denies pain. Patient has been more active than usual for the past week, she is cleaning out an apartment. She also complains of lightheadedness and nausea which she believes is due to her increased activity and dehydration. The patient is being admitted for neck pain and dizziness to r/o stroke.    Dizziness: Patient presented with dizziness. Patient was  code stroke in ED with NIHSS 0. CT head done showing no acute hemorrhage or infarct. code stroke in ED with NIHSS 0. CT angiography neck showing Patent cervical vasculature.  Mild atherosclerotic changes of the right carotid bulb.  No hemodynamically significant carotid stenosis   using NASCET criteria.  No flow-limiting vertebral artery stenosis. CTA brain showing left superior cerebellar artery grossly patent with no major vessel occlusion. No evidence of dural venous sinus thrombosis or an arteriovenous malformation. Patient given aspirin and stain. Neurology consulted recommended no further imaging. Orthostatic vitals were negative. Echocardiogram was done showing normal ejection fraction and diastolic function of the heart. Patient to take meclizine 25mg as needed for dizziness and follow outpatient with neurology.     HTN: Patient with hx of htn, managed with home medications. DASH diet    Thyroid Goitre: Patient noted with goitre. CT scan showing Diffusely heterogeneous thyroid with multiple nodules measuring up to   1.5 cm. Outpatient thyroid ultrasound.     This is just a brief medical summary. Fir further details, please see medical chart. Case discussed with medical attending.

## 2023-07-14 NOTE — DISCHARGE NOTE PROVIDER - PROVIDER TOKENS
PROVIDER:[TOKEN:[[id number]]] PROVIDER:[TOKEN:[[id number]]],PROVIDER:[TOKEN:[[id number]],FOLLOWUP:[2 weeks]] PROVIDER:[TOKEN:[[id number]]],PROVIDER:[TOKEN:[[id number]],FOLLOWUP:[2 weeks]],PROVIDER:[TOKEN:[[id number]],FOLLOWUP:[1 week]]

## 2023-07-14 NOTE — PROGRESS NOTE ADULT - PROBLEM SELECTOR PLAN 2
-Pt with a hx of goiter  -CT: Diffusely heterogeneous thyroid with multiple nodules measuring up to 1.5 cm in the right thyroid lobe. -Pt with a hx of goiter  -CT: Diffusely heterogeneous thyroid with multiple nodules measuring up to 1.5 cm in the right thyroid lobe  - outpatient evaluation

## 2023-07-20 ENCOUNTER — EMERGENCY (EMERGENCY)
Facility: HOSPITAL | Age: 78
LOS: 1 days | Discharge: HOME | End: 2023-07-20
Attending: STUDENT IN AN ORGANIZED HEALTH CARE EDUCATION/TRAINING PROGRAM
Payer: COMMERCIAL

## 2023-07-20 VITALS
OXYGEN SATURATION: 99 % | HEART RATE: 67 BPM | DIASTOLIC BLOOD PRESSURE: 84 MMHG | RESPIRATION RATE: 16 BRPM | WEIGHT: 186.95 LBS | HEIGHT: 67 IN | SYSTOLIC BLOOD PRESSURE: 151 MMHG | TEMPERATURE: 98 F

## 2023-07-20 VITALS
HEART RATE: 72 BPM | SYSTOLIC BLOOD PRESSURE: 135 MMHG | OXYGEN SATURATION: 99 % | TEMPERATURE: 98 F | DIASTOLIC BLOOD PRESSURE: 80 MMHG | RESPIRATION RATE: 18 BRPM

## 2023-07-20 DIAGNOSIS — D24.9 BENIGN NEOPLASM OF UNSPECIFIED BREAST: Chronic | ICD-10-CM

## 2023-07-20 LAB
ALBUMIN SERPL ELPH-MCNC: 3.6 G/DL — SIGNIFICANT CHANGE UP (ref 3.5–5)
ALP SERPL-CCNC: 64 U/L — SIGNIFICANT CHANGE UP (ref 40–120)
ALT FLD-CCNC: 23 U/L DA — SIGNIFICANT CHANGE UP (ref 10–60)
ANION GAP SERPL CALC-SCNC: 7 MMOL/L — SIGNIFICANT CHANGE UP (ref 5–17)
AST SERPL-CCNC: 22 U/L — SIGNIFICANT CHANGE UP (ref 10–40)
BASOPHILS # BLD AUTO: 0.02 K/UL — SIGNIFICANT CHANGE UP (ref 0–0.2)
BASOPHILS NFR BLD AUTO: 0.3 % — SIGNIFICANT CHANGE UP (ref 0–2)
BILIRUB SERPL-MCNC: 0.4 MG/DL — SIGNIFICANT CHANGE UP (ref 0.2–1.2)
BUN SERPL-MCNC: 12 MG/DL — SIGNIFICANT CHANGE UP (ref 7–18)
CALCIUM SERPL-MCNC: 9.2 MG/DL — SIGNIFICANT CHANGE UP (ref 8.4–10.5)
CHLORIDE SERPL-SCNC: 102 MMOL/L — SIGNIFICANT CHANGE UP (ref 96–108)
CO2 SERPL-SCNC: 28 MMOL/L — SIGNIFICANT CHANGE UP (ref 22–31)
CREAT SERPL-MCNC: 0.86 MG/DL — SIGNIFICANT CHANGE UP (ref 0.5–1.3)
EGFR: 70 ML/MIN/1.73M2 — SIGNIFICANT CHANGE UP
EOSINOPHIL # BLD AUTO: 0.07 K/UL — SIGNIFICANT CHANGE UP (ref 0–0.5)
EOSINOPHIL NFR BLD AUTO: 1 % — SIGNIFICANT CHANGE UP (ref 0–6)
GLUCOSE SERPL-MCNC: 114 MG/DL — HIGH (ref 70–99)
HCT VFR BLD CALC: 40.6 % — SIGNIFICANT CHANGE UP (ref 34.5–45)
HGB BLD-MCNC: 13 G/DL — SIGNIFICANT CHANGE UP (ref 11.5–15.5)
IMM GRANULOCYTES NFR BLD AUTO: 0.3 % — SIGNIFICANT CHANGE UP (ref 0–0.9)
LYMPHOCYTES # BLD AUTO: 0.95 K/UL — LOW (ref 1–3.3)
LYMPHOCYTES # BLD AUTO: 13.9 % — SIGNIFICANT CHANGE UP (ref 13–44)
MAGNESIUM SERPL-MCNC: 2.2 MG/DL — SIGNIFICANT CHANGE UP (ref 1.6–2.6)
MCHC RBC-ENTMCNC: 26.8 PG — LOW (ref 27–34)
MCHC RBC-ENTMCNC: 32 GM/DL — SIGNIFICANT CHANGE UP (ref 32–36)
MCV RBC AUTO: 83.7 FL — SIGNIFICANT CHANGE UP (ref 80–100)
MONOCYTES # BLD AUTO: 0.47 K/UL — SIGNIFICANT CHANGE UP (ref 0–0.9)
MONOCYTES NFR BLD AUTO: 6.9 % — SIGNIFICANT CHANGE UP (ref 2–14)
NEUTROPHILS # BLD AUTO: 5.29 K/UL — SIGNIFICANT CHANGE UP (ref 1.8–7.4)
NEUTROPHILS NFR BLD AUTO: 77.6 % — HIGH (ref 43–77)
NRBC # BLD: 0 /100 WBCS — SIGNIFICANT CHANGE UP (ref 0–0)
PLATELET # BLD AUTO: 235 K/UL — SIGNIFICANT CHANGE UP (ref 150–400)
POTASSIUM SERPL-MCNC: 3.4 MMOL/L — LOW (ref 3.5–5.3)
POTASSIUM SERPL-SCNC: 3.4 MMOL/L — LOW (ref 3.5–5.3)
PROT SERPL-MCNC: 8.1 G/DL — SIGNIFICANT CHANGE UP (ref 6–8.3)
RBC # BLD: 4.85 M/UL — SIGNIFICANT CHANGE UP (ref 3.8–5.2)
RBC # FLD: 13.4 % — SIGNIFICANT CHANGE UP (ref 10.3–14.5)
SODIUM SERPL-SCNC: 137 MMOL/L — SIGNIFICANT CHANGE UP (ref 135–145)
WBC # BLD: 6.82 K/UL — SIGNIFICANT CHANGE UP (ref 3.8–10.5)
WBC # FLD AUTO: 6.82 K/UL — SIGNIFICANT CHANGE UP (ref 3.8–10.5)

## 2023-07-20 PROCEDURE — 85025 COMPLETE CBC W/AUTO DIFF WBC: CPT

## 2023-07-20 PROCEDURE — 99283 EMERGENCY DEPT VISIT LOW MDM: CPT

## 2023-07-20 PROCEDURE — 99284 EMERGENCY DEPT VISIT MOD MDM: CPT

## 2023-07-20 PROCEDURE — 80053 COMPREHEN METABOLIC PANEL: CPT

## 2023-07-20 PROCEDURE — 83735 ASSAY OF MAGNESIUM: CPT

## 2023-07-20 PROCEDURE — 36415 COLL VENOUS BLD VENIPUNCTURE: CPT

## 2023-07-20 NOTE — ED ADULT TRIAGE NOTE - CHIEF COMPLAINT QUOTE
c/o cramps in bilateral hands and some difficulty/discomfort in swallowing, concerned if it has something to do with new BP medication (amlodipine) or the episode of food being stuck in her throat weeks ago, presently talking and breathing normally, no distress

## 2023-07-20 NOTE — ED PROVIDER NOTE - PHYSICAL EXAMINATION
General: well appearing, no acute distress   HEENT: normocephalic, atraumatic, oropharynx without exudate or erythema, left submandibular lymphadenopathy   Respiratory: normal work of breathing, lungs clear to auscultation bilaterally   Cardiac: regular rate and rhythm   Abdomen: soft, non-tender, no guarding or rebound   MSK: no swelling or tenderness of lower extremities, moving all extremities spontaneously   Skin: warm, dry   Neuro: A&Ox3  Psych: appropriate affect

## 2023-07-20 NOTE — ED PROVIDER NOTE - CLINICAL SUMMARY MEDICAL DECISION MAKING FREE TEXT BOX
78M presenting with difficulty swallowing and muscle spasms. low concern for ludwigs. patient tolerating secretions. concern for electrolyte abnormality, particularly of calcium given symptoms. will get labs and reassess.

## 2023-07-20 NOTE — ED PROVIDER NOTE - CARE PROVIDER_API CALL
Dex Cabrera (MD)  Clinical Neurophysiology; Neurology; Sleep Medicine  [address] [facility], [address]nd [place] [address]  Phone: [phone]  Fax: [phone]  Follow Up Time:

## 2023-07-20 NOTE — ED ADULT NURSE NOTE - OBJECTIVE STATEMENT
pt states she started taking amlodipine 2.5mg PO on tuesday and started having cramping feeling on fingers of both hands and felt her throat was dry. NIH 0. gcs15 breathing even and unlabored. pt takes atenolol. denies hx of surgeries and blood thinner use.

## 2023-07-20 NOTE — ED ADULT NURSE NOTE - NSFALLUNIVINTERV_ED_ALL_ED
Bed/Stretcher in lowest position, wheels locked, appropriate side rails in place/Call bell, personal items and telephone in reach/Instruct patient to call for assistance before getting out of bed/chair/stretcher/Non-slip footwear applied when patient is off stretcher/[place] to call system/Physically safe environment - no spills, clutter or unnecessary equipment/Purposeful proactive rounding/Room/bathroom lighting operational, light cord in reach

## 2023-07-20 NOTE — ED PROVIDER NOTE - PATIENT PORTAL LINK FT
You can access the FollowMyHealth Patient Portal offered by [facility] by registering at the following website: [email or web address]. By joining [employer]’s FollowMyHealth portal, you will also be able to view your health information using other applications (apps) compatible with our system.

## 2023-07-20 NOTE — ED ADULT NURSE NOTE - MODE OF DISCHARGE
Ambulatory Muscle Hinge Flap Text: The defect edges were debeveled with a #15 scalpel blade.  Given the size, depth and location of the defect and the proximity to free margins a muscle hinge flap was deemed most appropriate.  Using a sterile surgical marker, an appropriate hinge flap was drawn incorporating the defect. The area thus outlined was incised with a #15 scalpel blade.  The skin margins were undermined to an appropriate distance in all directions utilizing iris scissors.

## 2023-07-20 NOTE — ED ADULT NURSE NOTE - BREATH SOUNDS, RIGHT
"Occupational Therapy Treatment completed with focus on ADLs, patient education, caregiver training and upper extremity function.  Functional Status:  SPV to don socks, SBA HEP w/ yellow resistance band  Plan of Care: Decreasing frequency to DC needs only  Discharge Recommendations:  Equipment Tub Transfer Bench. Post-acute therapy: Plan is to return home w/ hospice    See \"Rehab Therapy-Acute\" Patient Summary Report for complete documentation.     Pt seen for OT tx, he is optimistic about returning home soon. Spent time ed/training family on ADL needs in relation to hospice. Provided pt and and family equipment resource guide and ed on use of a tub txf bench to assist w/ energy conservation and decrease likelihood for falls. Pt ed/trained on HEP for B UE's using yellow resistance band. Pt demo'd understanding. Will remain available for DC needs only.   " clear

## 2023-07-20 NOTE — ED PROVIDER NOTE - NSFOLLOWUPINSTRUCTIONS_ED_ALL_ED_FT
Thank you for choosing [facility] for your health care.    You were seen in the emergency room for problems swallowing and spasming of the muscles.  You had screening blood work in the emergency room for an electrolyte abnormality which did not show any obvious cause of your symptoms or life-threatening medical issue at this time.  It does not mean there is not a reason for your complaints and we recommend you follow-up closely with a neurologist to further evaluate it.  Please return to the emergency room if you are having difficulty breathing, if you cannot move, for any strokelike symptoms or for any other concerning or emergent medical issues.

## 2023-07-20 NOTE — ED PROVIDER NOTE - PROGRESS NOTE DETAILS
Received sign out from Dr Faulkner, labs not emergently actionable, will discharge with neurology follow up.  Nathaniel Hammond M.D.

## 2023-07-20 NOTE — ED PROVIDER NOTE - OBJECTIVE STATEMENT
78F, pmh of HTN and LANDY presenting with difficulty swallowing and muscle spasms. patient reports she has felt like she has been having difficulty swallowing for several weeks. has been seen by ENT and had an endoscopy without concerning findings. was recently started on amlodipine and for the past several days has felt like her hands were cramping and spasming no chest pain or trouble breathing.

## 2023-09-18 NOTE — ED ADULT NURSE NOTE - NS_ED_NURSE_TEACHING_TOPIC_ED_A_ED
H&P reviewed. The patient was examined and there are no changes to the H&P.         Cardiac/Respiratory

## 2023-10-02 NOTE — ED PROVIDER NOTE - CPE EDP MUSC NORM
Initiate Treatment: gentamicin -Apply to the nostrils and affected area on nasal tip twice daily for two weeks Detail Level: Zone Render In Strict Bullet Format?: No normal...

## 2023-10-06 ENCOUNTER — EMERGENCY (EMERGENCY)
Facility: HOSPITAL | Age: 78
LOS: 1 days | Discharge: HOME | End: 2023-10-06
Attending: STUDENT IN AN ORGANIZED HEALTH CARE EDUCATION/TRAINING PROGRAM
Payer: COMMERCIAL

## 2023-10-06 VITALS
TEMPERATURE: 98 F | HEART RATE: 106 BPM | DIASTOLIC BLOOD PRESSURE: 80 MMHG | OXYGEN SATURATION: 98 % | WEIGHT: 191.8 LBS | RESPIRATION RATE: 18 BRPM | SYSTOLIC BLOOD PRESSURE: 170 MMHG

## 2023-10-06 VITALS
RESPIRATION RATE: 18 BRPM | HEART RATE: 88 BPM | DIASTOLIC BLOOD PRESSURE: 82 MMHG | TEMPERATURE: 98 F | SYSTOLIC BLOOD PRESSURE: 158 MMHG | OXYGEN SATURATION: 98 %

## 2023-10-06 DIAGNOSIS — D24.9 BENIGN NEOPLASM OF UNSPECIFIED BREAST: Chronic | ICD-10-CM

## 2023-10-06 LAB
ALBUMIN SERPL ELPH-MCNC: 3.3 G/DL — LOW (ref 3.5–5)
ALP SERPL-CCNC: 71 U/L — SIGNIFICANT CHANGE UP (ref 40–120)
ALT FLD-CCNC: 23 U/L DA — SIGNIFICANT CHANGE UP (ref 10–60)
ANION GAP SERPL CALC-SCNC: 4 MMOL/L — LOW (ref 5–17)
AST SERPL-CCNC: 17 U/L — SIGNIFICANT CHANGE UP (ref 10–40)
BASOPHILS # BLD AUTO: 0.03 K/UL — SIGNIFICANT CHANGE UP (ref 0–0.2)
BASOPHILS NFR BLD AUTO: 0.5 % — SIGNIFICANT CHANGE UP (ref 0–2)
BILIRUB SERPL-MCNC: 0.4 MG/DL — SIGNIFICANT CHANGE UP (ref 0.2–1.2)
BUN SERPL-MCNC: 13 MG/DL — SIGNIFICANT CHANGE UP (ref 7–18)
CALCIUM SERPL-MCNC: 9 MG/DL — SIGNIFICANT CHANGE UP (ref 8.4–10.5)
CHLORIDE SERPL-SCNC: 101 MMOL/L — SIGNIFICANT CHANGE UP (ref 96–108)
CO2 SERPL-SCNC: 31 MMOL/L — SIGNIFICANT CHANGE UP (ref 22–31)
CREAT SERPL-MCNC: 0.76 MG/DL — SIGNIFICANT CHANGE UP (ref 0.5–1.3)
EGFR: 81 ML/MIN/1.73M2 — SIGNIFICANT CHANGE UP
EOSINOPHIL # BLD AUTO: 0.03 K/UL — SIGNIFICANT CHANGE UP (ref 0–0.5)
EOSINOPHIL NFR BLD AUTO: 0.5 % — SIGNIFICANT CHANGE UP (ref 0–6)
GLUCOSE SERPL-MCNC: 132 MG/DL — HIGH (ref 70–99)
HCT VFR BLD CALC: 38.5 % — SIGNIFICANT CHANGE UP (ref 34.5–45)
HGB BLD-MCNC: 12.4 G/DL — SIGNIFICANT CHANGE UP (ref 11.5–15.5)
IMM GRANULOCYTES NFR BLD AUTO: 0.3 % — SIGNIFICANT CHANGE UP (ref 0–0.9)
LYMPHOCYTES # BLD AUTO: 0.81 K/UL — LOW (ref 1–3.3)
LYMPHOCYTES # BLD AUTO: 12.8 % — LOW (ref 13–44)
MAGNESIUM SERPL-MCNC: 2 MG/DL — SIGNIFICANT CHANGE UP (ref 1.6–2.6)
MCHC RBC-ENTMCNC: 27.1 PG — SIGNIFICANT CHANGE UP (ref 27–34)
MCHC RBC-ENTMCNC: 32.2 GM/DL — SIGNIFICANT CHANGE UP (ref 32–36)
MCV RBC AUTO: 84.1 FL — SIGNIFICANT CHANGE UP (ref 80–100)
MONOCYTES # BLD AUTO: 0.55 K/UL — SIGNIFICANT CHANGE UP (ref 0–0.9)
MONOCYTES NFR BLD AUTO: 8.7 % — SIGNIFICANT CHANGE UP (ref 2–14)
NEUTROPHILS # BLD AUTO: 4.9 K/UL — SIGNIFICANT CHANGE UP (ref 1.8–7.4)
NEUTROPHILS NFR BLD AUTO: 77.2 % — HIGH (ref 43–77)
NRBC # BLD: 0 /100 WBCS — SIGNIFICANT CHANGE UP (ref 0–0)
NT-PROBNP SERPL-SCNC: 23 PG/ML — SIGNIFICANT CHANGE UP (ref 0–450)
PHOSPHATE SERPL-MCNC: 3.4 MG/DL — SIGNIFICANT CHANGE UP (ref 2.5–4.5)
PLATELET # BLD AUTO: 212 K/UL — SIGNIFICANT CHANGE UP (ref 150–400)
POTASSIUM SERPL-MCNC: 3.4 MMOL/L — LOW (ref 3.5–5.3)
POTASSIUM SERPL-SCNC: 3.4 MMOL/L — LOW (ref 3.5–5.3)
PROT SERPL-MCNC: 7.5 G/DL — SIGNIFICANT CHANGE UP (ref 6–8.3)
RBC # BLD: 4.58 M/UL — SIGNIFICANT CHANGE UP (ref 3.8–5.2)
RBC # FLD: 13.7 % — SIGNIFICANT CHANGE UP (ref 10.3–14.5)
SODIUM SERPL-SCNC: 136 MMOL/L — SIGNIFICANT CHANGE UP (ref 135–145)
TROPONIN I, HIGH SENSITIVITY RESULT: 9.6 NG/L — SIGNIFICANT CHANGE UP
WBC # BLD: 6.34 K/UL — SIGNIFICANT CHANGE UP (ref 3.8–10.5)
WBC # FLD AUTO: 6.34 K/UL — SIGNIFICANT CHANGE UP (ref 3.8–10.5)

## 2023-10-06 PROCEDURE — 99283 EMERGENCY DEPT VISIT LOW MDM: CPT

## 2023-10-06 PROCEDURE — 93010 ELECTROCARDIOGRAM REPORT: CPT

## 2023-10-06 PROCEDURE — 83735 ASSAY OF MAGNESIUM: CPT

## 2023-10-06 PROCEDURE — 83880 ASSAY OF NATRIURETIC PEPTIDE: CPT

## 2023-10-06 PROCEDURE — 84100 ASSAY OF PHOSPHORUS: CPT

## 2023-10-06 PROCEDURE — 84484 ASSAY OF TROPONIN QUANT: CPT

## 2023-10-06 PROCEDURE — 80053 COMPREHEN METABOLIC PANEL: CPT

## 2023-10-06 PROCEDURE — 85025 COMPLETE CBC W/AUTO DIFF WBC: CPT

## 2023-10-06 PROCEDURE — 93005 ELECTROCARDIOGRAM TRACING: CPT

## 2023-10-06 PROCEDURE — 36415 COLL VENOUS BLD VENIPUNCTURE: CPT

## 2023-10-06 PROCEDURE — 99285 EMERGENCY DEPT VISIT HI MDM: CPT

## 2023-10-06 NOTE — ED ADULT NURSE NOTE - NSFALLUNIVINTERV_ED_ALL_ED
Bed/Stretcher in lowest position, wheels locked, appropriate side rails in place/Call bell, personal items and telephone in reach/Instruct patient to call for assistance before getting out of bed/chair/stretcher/Non-slip footwear applied when patient is off stretcher/[place] to call system/Physically safe environment - no spills, clutter or unnecessary equipment/Purposeful proactive rounding/Room/bathroom lighting operational, light cord in reach

## 2023-10-06 NOTE — ED ADULT NURSE NOTE - IN THE PAST 12 MONTHS HAVE YOU USED DRUGS OTHER THAN THOSE REQUIRED FOR MEDICAL REASON?
"Pre-procedure Intake    Have you been fasting? No    If yes, for how long?     Are you taking a prescribed blood thinner such as coumadin, Plavix, Xarelto?    No    If yes, when did you take your last dose?     Do you take aspirin?   NO    If cervical procedure, have you held aspirin for 6 days?   NA    Do you have any allergies to contrast dye, iodine, steroid and/or numbing medications?  NO    Are you currently taking antibiotics or have an active infection?  NO    Have you had a fever/elevated temperature within the past week? NO    Are you currently taking oral steroids? NO    Do you have a ? Yes       Are you pregnant or breastfeeding? NA    Have you received the COVID-19 vaccine? Yes     If yes, was it your 1st, 2nd or only dose needed? 2nd dose  \"over 3 weeks ago\"    Are the vital signs normal?  Yes      " No

## 2023-10-06 NOTE — ED PROVIDER NOTE - PRO INTERPRETER NEED 2
English RN note: aaox5 female presents to the ED for nausea and concern for allergic reaction. reports eating food at 630pm and developed nausea, itchig. pt took benadryl 25mg at 715pm, did not feel better, and took another benadryl 25mg at 745pm. RN note: aaox4 female presents to the ED for nausea and concern for allergic reaction. reports eating outside food at 630pm and developed nausea, itchy throat. she became concerned because she reports history of  anaphylaxis to peanuts. pt took benadryl 25mg  PTA, at 715pm, did not feel better, and took another benadryl 25mg at 745pm. at this time, reports nausea. no lip or tongue swelling noted. denies sob, dizziness, chest pain, sore or itchy throat, lips or tongue. respirations are symmetrical and unlabored on room air. b/l breath sounds CTA. no rash noted. VS stable. maintained on continuous pulse oximetry. instructed to alert RN if cp/sob occurs or symptoms worsen. will monitor and reassess -gabriela brantley RN

## 2023-10-06 NOTE — ED ADULT TRIAGE NOTE - CHIEF COMPLAINT QUOTE
My blood pressure is high, I have left knee pain.  I start taking new blood pressure medication Amlodipine 5 mg at night.  Last dose 11 pm.

## 2023-10-06 NOTE — ED ADULT NURSE NOTE - OBJECTIVE STATEMENT
Pt stated she changed her blood pressure medication c/o her blood pressure was high, felt her heart beating fast and left knee pain and swelling.

## 2023-10-06 NOTE — ED PROVIDER NOTE - NSFOLLOWUPINSTRUCTIONS_ED_ALL_ED_FT
You were seen for hypertension. Please continue taking your blood pressure medication as prescribed.     No signs of emergency medical condition on today's workup.  Your results are attached with your discharge instructions, please review them with your primary care physician. If there is a result pending, you will receive a call if test is positive.    A presumptive diagnosis is made today, but further evaluation may be required by your primary care doctor and/or specialist for a definitive diagnosis. Therefore, follow up as directed and if symptoms change/worsen or any emergency conditions, please return to the ER.    For pain or fever you can ibuprofen (motrin, advil) or tylenol as needed, as directed on packaging.    If needed, call patient access services at [phone] to find a primary care doctor, or call at [phone] to make an appointment at the clinic.

## 2023-10-06 NOTE — ED PROVIDER NOTE - CLINICAL SUMMARY MEDICAL DECISION MAKING FREE TEXT BOX
78-year-old female with past medical history of hypertension coming in with heaviness feeling in her body.  Patient reports her blood pressure medication was changed from atenolol to amlodipine about 3 weeks ago because her heart rate was dropping on atenolol.  Patient reports she read online that amlodipine causes water retention in the body.  So she has been concerned.  States she weighed herself at home and her weight is unchanged.  Denies chest pain, shortness of breath, dizziness, headache, abdominal pain, nausea, vomiting, change in strength or sensation in extremities.  Patient does not have any complaints of knees as mentioned in the triage note.  Patient is well-appearing.  No distress.  Abdomen soft nontender.  Equal strength and sensation in all extremities. Patient came in to be assessed due to which she read online however does not have any symptoms at this time.  Plan to eval for ACS, electrolyte abnormalities, anemia.  EKG normal sinus rhythm.    Results were discussed with the patient all questions were answered.  Patient is encouraged to continue taking the medications as prescribed and follow-up with PCP.

## 2023-10-06 NOTE — ED PROVIDER NOTE - PATIENT PORTAL LINK FT
You can access the FollowMyHealth Patient Portal offered by [facility] by registering at the following website: [email or web address]. By joining [employer]’s FollowMyHealth portal, you will also be able to view your health information using other applications (apps) compatible with our system.

## 2023-10-08 ENCOUNTER — EMERGENCY (EMERGENCY)
Facility: HOSPITAL | Age: 78
LOS: 1 days | Discharge: HOME | End: 2023-10-08
Attending: EMERGENCY MEDICINE
Payer: COMMERCIAL

## 2023-10-08 VITALS
OXYGEN SATURATION: 97 % | HEART RATE: 98 BPM | SYSTOLIC BLOOD PRESSURE: 172 MMHG | WEIGHT: 188.5 LBS | TEMPERATURE: 98 F | DIASTOLIC BLOOD PRESSURE: 77 MMHG | HEIGHT: 67 IN | RESPIRATION RATE: 18 BRPM

## 2023-10-08 VITALS
HEART RATE: 88 BPM | OXYGEN SATURATION: 99 % | DIASTOLIC BLOOD PRESSURE: 70 MMHG | TEMPERATURE: 98 F | RESPIRATION RATE: 16 BRPM | SYSTOLIC BLOOD PRESSURE: 160 MMHG

## 2023-10-08 DIAGNOSIS — D24.9 BENIGN NEOPLASM OF UNSPECIFIED BREAST: Chronic | ICD-10-CM

## 2023-10-08 PROCEDURE — 99282 EMERGENCY DEPT VISIT SF MDM: CPT

## 2023-10-08 PROCEDURE — 99283 EMERGENCY DEPT VISIT LOW MDM: CPT

## 2023-10-08 NOTE — ED PROVIDER NOTE - DISPOSITION TYPE
Arrived to the [facility] ambulatory using walker. chemo completed. Patient tolerated well. Any issues or concerns during appointment: no.  Patient aware of next infusion appointment on 11/9 at 1115. Discharged to assisted living, pt picked up by Isac Weems  at [facility] [phone].
DISCHARGE

## 2023-10-08 NOTE — ED PROVIDER NOTE - MUSCULOSKELETAL, MLM
No tenderness along the entire left buttock/LLE; no appreciable swelling to LLE; tactile temperature of LLE is normal, no erythema, full ROM of LLE and normal gait, NV intact

## 2023-10-08 NOTE — ED PROVIDER NOTE - OBJECTIVE STATEMENT
78-year-old woman, history of osteoarthritis, hypertension, complains of 4 days of feeling dull discomfort in her left buttock radiating down her left leg was subjective feeling of swelling to her left knee and chills.  He has not had any actual fever and says the chills are intermittent.  She denies any injury/fall.  She has not had any skin changes/redness/rash.  She denies any shortness of breath or chest pain.   No weakness/numbness.  She is able to walk.    She has no history of DVT or PE.  She was actually here 2 days ago in the ED, and that encounter was more related to her blood pressure.  She did have labs done that day and there are no significant abnormalities.

## 2023-10-08 NOTE — ED PROVIDER NOTE - NSFOLLOWUPINSTRUCTIONS_ED_ALL_ED_FT
English    Musculoskeletal Pain  Musculoskeletal pain refers to aches and pains in your bones, joints, muscles, and the tissues that surround them. This pain can occur in any part of the body. It can last for a short time (acute) or a long time (chronic).    A physical exam, lab tests, and imaging studies may be done to find the cause of your musculoskeletal pain.    Follow these instructions at home:  Lifestyle    Try to control or lower your stress levels. Stress increases muscle tension and can worsen musculoskeletal pain. It is important to recognize when you are anxious or stressed and learn ways to manage it. This may include:  Meditation or yoga.  Cognitive or behavioral therapy.  Acupuncture or massage therapy.  You may continue all activities unless the activities cause more pain. When the pain gets better, slowly resume your normal activities. Gradually increase the intensity and duration of your activities or exercise.  Managing pain, stiffness, and swelling        Treatment may include medicines for pain and inflammation that are taken by mouth or applied to the skin. Take over-the-counter and prescription medicines only as told by your health care provider.  When your pain is severe, bed rest may be helpful. Lie or sit in any position that is comfortable, but get out of bed and walk around at least every couple of hours.  If directed, apply heat to the affected area as often as told by your health care provider. Use the heat source that your health care provider recommends, such as a moist heat pack or a heating pad.  Place a towel between your skin and the heat source.  Leave the heat on for 20–30 minutes.  Remove the heat if your skin turns bright red. This is especially important if you are unable to feel pain, heat, or cold. You may have a greater risk of getting burned.  If directed, put ice on the painful area. To do this:  Put ice in a plastic bag.  Place a towel between your skin and the bag.  Leave the ice on for 20 minutes, 2–3 times a day.  Remove the ice if your skin turns bright red. This is very important. If you cannot feel pain, heat, or cold, you have a greater risk of damage to the area.  General instructions    Your health care provider may recommend that you see a physical therapist. This person can help you come up with a safe exercise program.  If told by your health care provider, do physical therapy exercises to improve movement and strength in the affected area.  Keep all follow-up visits. This is important. This includes any physical therapy visits.  Contact a health care provider if:  Your pain gets worse.  Medicines do not help ease your pain.  You cannot use the part of your body that hurts, such as your arm, leg, or neck.  You have trouble sleeping.  You have trouble doing your normal activities.  Get help right away if:  You have a new injury and your pain is worse or different.  You feel numb or you have tingling in the painful area.  Summary  Musculoskeletal pain refers to aches and pains in your bones, joints, muscles, and the tissues that surround them.  This pain can occur in any part of the body.  Your health care provider may recommend that you see a physical therapist. This person can help you come up with a safe exercise program. Do any exercises as told by your physical therapist.  Lower your stress level. Stress can worsen musculoskeletal pain. Ways to lower stress may include meditation, yoga, cognitive or behavioral therapy, acupuncture, and massage therapy.  This information is not intended to replace advice given to you by your health care provider. Make sure you discuss any questions you have with your health care provider.    Document Revised: 04/22/2021 Document Reviewed: 03/31/2021  Elsevier Patient Education © 2023 Elsevier Inc.

## 2023-10-08 NOTE — ED PROVIDER NOTE - IV ALTEPLASE EXCL REL HIDDEN
1/13/2021      Karely Javier  [address] [facility] [address]      Dear Ms. Javier,      Your recent test results are as follows:    Urine culture negative for bacterial growth.       Recommendations:    A copy of your labs is enclosed.      Sincerely,      Carol Garcia M.D.   [address] [address] [place] [address]  Ph:  [phone]  Fax: [phone]                Karely Javier  1962    Resulted Orders   POCT URINE DIP NON-AUTO   Result Value Ref Range    POCT Color Yellow     POCT Appearance Cloudy     POCT Glucose Urine Negative Negative    POCT Bilirubin Negative Negative    POCT Ketones Negative Negative    POCT Specific Gravity 1.005 1.005 - 1.03    POCT Occult Blood Small Negative    POCT pH 5.0 5 - 9    POCT Protein Trace Negative    POCT Urobilinogen 0.2 0 - 1 mg/dL    Urine Nitrite Negative Negative    WBC (Leukocyte) Esterase POC Large Negative   URINE, BACTERIAL CULTURE   Result Value Ref Range    Urine, Bacterial Culture       10,000 - 50,000 CFU/mL Mixed bacterial calvin with no predominating type       Karely Javier  1962     show

## 2023-10-08 NOTE — ED ADULT NURSE NOTE - NSFALLHARMRISKINTERV_ED_ALL_ED

## 2023-10-08 NOTE — ED ADULT TRIAGE NOTE - BIRTH SEX
History and Physical     Patient: Devon Mello               Sex: male            MRN: [id number]     YOB: 1980      Age: 42 year old               Subjective:     Devon Mello is a 42 year old male who has rectal bleeding , fhx colon polyps, loose stools     History reviewed. No pertinent past medical history.  History reviewed. No pertinent surgical history.   Family History   Problem Relation Age of Onset   • Cancer Maternal Grandfather      Social History     Tobacco Use   • Smoking status: Never   • Smokeless tobacco: Never   Substance Use Topics   • Alcohol use: Yes     Comment: social      Prior to Admission medications    Not on File     ALLERGIES:  No Known Allergies    Review of Systems:  A comprehensive 10 points review of systems was negative.    Objective:     Visit Vitals  /74   Pulse 60   Temp 97.2 °F (36.2 °C) (Temporal)   Resp 15   Ht 5' 10.5\" (1.791 m)   Wt 86.2 kg (190 lb)   SpO2 96%   BMI 26.88 kg/m²        Physical Exam:    General Appearance:  Alert, cooperative, no distress   Head:  Normocephalic, without obvious abnormality, atraumatic   Eyes:  Non-icteric sclera   Throat: Lips, mucosa, and tongue normal   Lungs:   Clear to auscultation bilaterally, respirations unlabored   Heart:  Regular rate and rhythm, S1, S2 normal   Abdomen:   Soft, non-tender, bowel sounds active all four quadrants,  no masses, no organomegaly   Extremities: Extremities normal, atraumatic     Assessment     rectal bleeding , fhx colon polyps, loose stools       Plan     Plan: COLONOSCOPY      Mike Li MD  3/15/2023  7:23 AM   Female

## 2023-10-08 NOTE — ED ADULT TRIAGE NOTE - CHIEF COMPLAINT QUOTE
PT REPORTS LEFT HIP PAIN RADIATING DOWN LEFT LEG X 4 DAYS. + SWELLING IN LEFT KNEE. DENIES INJURY. + CHILLS

## 2023-10-08 NOTE — ED PROVIDER NOTE - CLINICAL SUMMARY MEDICAL DECISION MAKING FREE TEXT BOX
78-year-old woman, history of osteoarthritis, hypertension, complains of 4 days of feeling dull discomfort in her left buttock radiating down her left leg was subjective feeling of swelling to her left knee and chills.  He has not had any actual fever and says the chills are intermittent.  She denies any injury/fall.  She has not had any skin changes/redness/rash.  She denies any shortness of breath or chest pain.   No weakness/numbness--no indication for any testing or treatment emergently.  Well's Score for DVT is 0.  Suspect musculoskeletal pain, possible sciatica.  Advised strict return precautions and PMD f/u.

## 2023-10-08 NOTE — ED PROVIDER NOTE - PATIENT PORTAL LINK FT
You can access the FollowMyHealth Patient Portal offered by [facility] by registering at the following website: [email or web address]. By joining [employer]’s FollowMyHealth portal, you will also be able to view your health information using other applications (apps) compatible with our system.

## 2023-10-14 ENCOUNTER — EMERGENCY (EMERGENCY)
Facility: HOSPITAL | Age: 78
LOS: 1 days | Discharge: HOME | End: 2023-10-14
Attending: STUDENT IN AN ORGANIZED HEALTH CARE EDUCATION/TRAINING PROGRAM
Payer: COMMERCIAL

## 2023-10-14 VITALS
OXYGEN SATURATION: 98 % | HEIGHT: 67 IN | RESPIRATION RATE: 18 BRPM | HEART RATE: 90 BPM | TEMPERATURE: 99 F | DIASTOLIC BLOOD PRESSURE: 82 MMHG | SYSTOLIC BLOOD PRESSURE: 165 MMHG | WEIGHT: 182.98 LBS

## 2023-10-14 DIAGNOSIS — D24.9 BENIGN NEOPLASM OF UNSPECIFIED BREAST: Chronic | ICD-10-CM

## 2023-10-14 PROCEDURE — 99284 EMERGENCY DEPT VISIT MOD MDM: CPT

## 2023-10-14 PROCEDURE — 72192 CT PELVIS W/O DYE: CPT | Mod: MA

## 2023-10-14 PROCEDURE — 99284 EMERGENCY DEPT VISIT MOD MDM: CPT | Mod: 25

## 2023-10-15 VITALS
OXYGEN SATURATION: 100 % | DIASTOLIC BLOOD PRESSURE: 68 MMHG | SYSTOLIC BLOOD PRESSURE: 153 MMHG | RESPIRATION RATE: 18 BRPM | HEART RATE: 78 BPM | TEMPERATURE: 98 F

## 2023-10-15 PROCEDURE — 72192 CT PELVIS W/O DYE: CPT | Mod: 26,MA

## 2023-10-15 NOTE — ED PROVIDER NOTE - NSFOLLOWUPINSTRUCTIONS_ED_ALL_ED_FT
1) Follow up with your doctor this week. You were provided with information for the orthopedic surgeon, please call to make a follow up appointment  2) Return to the ED immediately for new or worsening symptoms  3) Please continue to take any home medications as prescribed  4) Your test results from your ED visit were discussed with you prior to discharge  5) You were provided with a copy of your test results  6) Please take Tylenol 1,000 mg every 6 hours as needed for pain. Please do not exceed more than 4,000mg of Tylenol in a day   7) Please take Motrin 600 mg by mouth every 6 hours as needed for pain. Please take this medication with food.

## 2023-10-15 NOTE — ED PROVIDER NOTE - PHYSICAL EXAMINATION
Gen: NAD, AOx3, able to make needs known, non-toxic  Head: NCAT  HEENT: EOMI, oral mucosa moist, normal conjunctiva  Lung: no respiratory distress, CTAB, no wheezes/rhonchi/rales B/L, speaking in full sentences  CV: RRR, no murmurs  Abd: non distended, soft, nontender, no guarding, no CVA tenderness  MSK: no visible deformities. Pelvis stable. LLE: no erythema/edema/ecchymosis overlying hip, FROM at hip, NVI  Neuro: Appears non focal  Skin: Warm, well perfused  Psych: normal affect

## 2023-10-15 NOTE — ED PROVIDER NOTE - CLINICAL SUMMARY MEDICAL DECISION MAKING FREE TEXT BOX
79 y/o F w/ PMH as above presenting w/ L hip pain. Pt overall well appearing, no acute distress. Exam w/ FROM. Given persistent symptoms, will obtain CT bone pelvis to eval for pathology including occult fracture. Doubtful collection, but will eval for one on CT. Possible sciatica. Possible strain vs. sprain. No edema, erythema to suggest infection or DVT. Pt denying need for analgesia. Will reassess the need for additional interventions as clinically warranted. Refer to any progress notes for updates on clinical course and as a continuation of this MDM.

## 2023-10-15 NOTE — ED PROVIDER NOTE - OBJECTIVE STATEMENT
79 y/o F w/ PMH of OA, HTN, LANDY presenting w/ L hip pain. Reports L hip pain worsening for greater than a week. Was seen here for similar last week. Had labs done, but no imaging. Describes feeling a "swelling" sensation to L hip. Has been experiencing a burning pain sensation to L hip radiating down left leg. No pain meds taken at home. Denies any recent falls or trauma. Has been able to walk normally. Denies fevers, chills, headache, dizziness, blurred vision, chest pain, cough, shortness of breath, abdominal pain, n/v/d/c, urinary symptoms, rash.

## 2023-10-15 NOTE — ED PROVIDER NOTE - CARE PROVIDER_API CALL
Juan Jose Sherman  Orthopaedic Surgery  [address] [place] [address], [address] [place] [address]  Phone: [phone]  Fax: [phone]  Follow Up Time:

## 2023-10-15 NOTE — ED ADULT NURSE NOTE - OBJECTIVE STATEMENT
As per patient c/o hip pain. Pt reports PMH of arthritis Pt denies any falls or traumas. No apparent distress noted. Pt denies all other signs and symptoms.

## 2023-10-15 NOTE — ED PROVIDER NOTE - NSFOLLOWUPCLINICS_GEN_ALL_ED_FT
[facility]  Orthopedics  [address] [place] [address]  Phone: [phone]  Fax: [phone]

## 2023-10-15 NOTE — ED PROVIDER NOTE - PATIENT PORTAL LINK FT
You can access the FollowMyHealth Patient Portal offered by [facility] by registering at the following website: [email or web address]. By joining [employer]’s FollowMyHealth portal, you will also be able to view your health information using other applications (apps) compatible with our system.

## 2023-10-31 ENCOUNTER — APPOINTMENT (OUTPATIENT)
Dept: NEUROLOGY | Facility: CLINIC | Age: 78
End: 2023-10-31
Payer: MEDICARE

## 2023-10-31 VITALS
HEART RATE: 86 BPM | SYSTOLIC BLOOD PRESSURE: 135 MMHG | BODY MASS INDEX: 29.03 KG/M2 | WEIGHT: 185 LBS | DIASTOLIC BLOOD PRESSURE: 67 MMHG | TEMPERATURE: 97.8 F | HEIGHT: 67 IN | OXYGEN SATURATION: 99 %

## 2023-10-31 DIAGNOSIS — E04.2 NONTOXIC MULTINODULAR GOITER: ICD-10-CM

## 2023-10-31 DIAGNOSIS — Z86.39 PERSONAL HISTORY OF OTHER ENDOCRINE, NUTRITIONAL AND METABOLIC DISEASE: ICD-10-CM

## 2023-10-31 DIAGNOSIS — R42 DIZZINESS AND GIDDINESS: ICD-10-CM

## 2023-10-31 DIAGNOSIS — Z09 ENCOUNTER FOR FOLLOW-UP EXAMINATION AFTER COMPLETED TREATMENT FOR CONDITIONS OTHER THAN MALIGNANT NEOPLASM: ICD-10-CM

## 2023-10-31 DIAGNOSIS — Z86.59 PERSONAL HISTORY OF OTHER MENTAL AND BEHAVIORAL DISORDERS: ICD-10-CM

## 2023-10-31 DIAGNOSIS — I65.09 OCCLUSION AND STENOSIS OF UNSPECIFIED VERTEBRAL ARTERY: ICD-10-CM

## 2023-10-31 DIAGNOSIS — I67.7 CEREBRAL ARTERITIS, NOT ELSEWHERE CLASSIFIED: ICD-10-CM

## 2023-10-31 DIAGNOSIS — I63.9 CEREBRAL INFARCTION, UNSPECIFIED: ICD-10-CM

## 2023-10-31 PROCEDURE — 99214 OFFICE O/P EST MOD 30 MIN: CPT

## 2023-10-31 RX ORDER — LORAZEPAM 1 MG/1
1 TABLET ORAL
Qty: 2 | Refills: 0 | Status: ACTIVE | COMMUNITY
Start: 2023-10-31 | End: 1900-01-01

## 2023-12-07 ENCOUNTER — NON-APPOINTMENT (OUTPATIENT)
Age: 78
End: 2023-12-07

## 2024-01-05 ENCOUNTER — NON-APPOINTMENT (OUTPATIENT)
Age: 79
End: 2024-01-05

## 2024-01-05 DIAGNOSIS — E04.1 NONTOXIC SINGLE THYROID NODULE: ICD-10-CM

## 2024-01-12 DIAGNOSIS — Q28.3 OTHER MALFORMATIONS OF CEREBRAL VESSELS: ICD-10-CM

## 2024-01-12 PROBLEM — E04.1 RIGHT THYROID NODULE: Status: ACTIVE | Noted: 2024-01-12

## 2024-01-12 LAB
BASOPHILS # BLD AUTO: 0.02 K/UL
BASOPHILS NFR BLD AUTO: 0.4 %
EOSINOPHIL # BLD AUTO: 0.04 K/UL
EOSINOPHIL NFR BLD AUTO: 0.7 %
HCT VFR BLD CALC: 42.7 %
HGB BLD-MCNC: 13.6 G/DL
IMM GRANULOCYTES NFR BLD AUTO: 0.4 %
LYMPHOCYTES # BLD AUTO: 1.03 K/UL
LYMPHOCYTES NFR BLD AUTO: 19.1 %
MAN DIFF?: NORMAL
MCHC RBC-ENTMCNC: 27.3 PG
MCHC RBC-ENTMCNC: 31.9 GM/DL
MCV RBC AUTO: 85.6 FL
MONOCYTES # BLD AUTO: 0.5 K/UL
MONOCYTES NFR BLD AUTO: 9.3 %
NEUTROPHILS # BLD AUTO: 3.78 K/UL
NEUTROPHILS NFR BLD AUTO: 70.1 %
PLATELET # BLD AUTO: 283 K/UL
RBC # BLD: 4.99 M/UL
RBC # FLD: 14 %
WBC # FLD AUTO: 5.39 K/UL

## 2024-01-18 LAB
ALBUMIN SERPL ELPH-MCNC: 4.4 G/DL
ALP BLD-CCNC: 75 U/L
ALT SERPL-CCNC: 18 U/L
ANION GAP SERPL CALC-SCNC: 9 MMOL/L
AST SERPL-CCNC: 23 U/L
BILIRUB SERPL-MCNC: 0.5 MG/DL
BUN SERPL-MCNC: 10 MG/DL
CALCIUM SERPL-MCNC: 10.1 MG/DL
CHLORIDE SERPL-SCNC: 100 MMOL/L
CO2 SERPL-SCNC: 32 MMOL/L
CREAT SERPL-MCNC: 0.77 MG/DL
CRP SERPL-MCNC: 6 MG/L
EGFR: 79 ML/MIN/1.73M2
ERYTHROCYTE [SEDIMENTATION RATE] IN BLOOD BY WESTERGREN METHOD: 22 MM/HR
GLUCOSE SERPL-MCNC: 107 MG/DL
POTASSIUM SERPL-SCNC: 3.5 MMOL/L
PROT SERPL-MCNC: 7.8 G/DL
SODIUM SERPL-SCNC: 141 MMOL/L
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
## 2024-02-16 NOTE — ED ADULT TRIAGE NOTE - WEIGHT IN KG
Medical screening examination initiated.  I have conducted a focused provider triage encounter, findings are as follows:    Brief history of present illness:  Daughter is historian.  She reports that the patient was seeing dead friends.  He was also seeing children at the house.  She states that he has silent seizures she states that the doctor says that when he starts hallucinating she he has probably had a seizure according to her.  She reports that his blood pressure vacillates up and down also when he has not had enough sleep.  He has not slept last night or today.  He has been very fidgety today.  She reports a urine as dark    Vitals:    02/15/24 2100   BP: 137/69   BP Location: Left arm   Patient Position: Sitting   Pulse: 69   Resp: 16   Temp: 97.6 °F (36.4 °C)   TempSrc: Oral   SpO2: 97%   Weight: 76.2 kg (168 lb)       Pertinent physical exam:  Patient is 89 years old.  He has a history of dementia.  He did remember seeing old friends that were dead as well as children today.  Currently is not hallucinating.    Brief workup plan:  Altered mental status workup.    Preliminary workup initiated; this workup will be continued and followed by the physician or advanced practice provider that is assigned to the patient when roomed.  
82.6

## 2024-02-21 NOTE — ED PROVIDER NOTE - PROGRESS NOTE DETAILS
Pt is well appearing walking with steady gait, stable for discharge and follow up without fail with medical doctor. I had a detailed discussion with the patient and/or guardian regarding the historical points, exam findings, and any diagnostic results supporting the discharge diagnosis. Pt educated on care and need for follow up. Strict return instructions and red flag signs and symptoms discussed with patient. Questions answered. Pt shows understanding of discharge information and agrees to follow. IV discontinued, cath removed intact

## 2024-03-20 ENCOUNTER — APPOINTMENT (OUTPATIENT)
Dept: NEUROSURGERY | Facility: CLINIC | Age: 79
End: 2024-03-20
Payer: MEDICARE

## 2024-03-20 VITALS
WEIGHT: 188 LBS | OXYGEN SATURATION: 96 % | BODY MASS INDEX: 29.45 KG/M2 | TEMPERATURE: 97.8 F | HEART RATE: 96 BPM | SYSTOLIC BLOOD PRESSURE: 132 MMHG | DIASTOLIC BLOOD PRESSURE: 69 MMHG

## 2024-03-20 PROCEDURE — 99215 OFFICE O/P EST HI 40 MIN: CPT

## 2024-03-20 PROCEDURE — 99205 OFFICE O/P NEW HI 60 MIN: CPT

## 2024-03-20 NOTE — PHYSICAL EXAM
[General Appearance - Alert] : alert [General Appearance - In No Acute Distress] : in no acute distress [Oriented To Time, Place, And Person] : oriented to person, place, and time [Sclera] : the sclera and conjunctiva were normal [Impaired Insight] : insight and judgment were intact [Neck Appearance] : the appearance of the neck was normal [Hearing Threshold Finger Rub Not [place]] : hearing was normal [] : no respiratory distress [Abnormal Walk] : normal gait [Edema] : there was no peripheral edema [Involuntary Movements] : no involuntary movements were seen [Motor Tone] : muscle strength and tone were normal [Skin Color & Pigmentation] : normal skin color and pigmentation

## 2024-03-25 NOTE — REVIEW OF SYSTEMS
[As Noted in HPI] : as noted in HPI [Negative] : Cardiovascular [Shortness Of Breath] : no shortness of breath [Suicidal] : not suicidal [Cough] : no cough [Abdominal Pain] : no abdominal pain [Vomiting] : no vomiting [Easy Bleeding] : no tendency for easy bleeding [Easy Bruising] : no tendency for easy bruising

## 2024-03-25 NOTE — END OF VISIT
[FreeTextEntry3] :  I have seen and evaluated patient with NP Maryuri Barnes who has completed the documentation above. No concerns for vasculitis or aneurysm.  [Time Spent: ___ minutes] : I have spent [unfilled] minutes of time on the encounter.

## 2024-03-25 NOTE — REASON FOR VISIT
[Consultation] : a consultation visit [Referred By: _________] : Patient was referred by DAVID [FreeTextEntry1] : imaging review vascular abnormality

## 2024-03-25 NOTE — ASSESSMENT
[FreeTextEntry1] : 78 yo female with no vascular abnormalities visualized on either 7/2023 CTA in PACS or [facility] 12/28/23 MRA of any concern.  There is no need for any further diagnostic studies at this time.  PLAN: No neurosurgery f/u required continue f/u with Dr. Cabrera and PCP for general medical management

## 2024-03-25 NOTE — HISTORY OF PRESENT ILLNESS
[> 3 months] : more  than 3 months [FreeTextEntry1] : imaging review obtained for dizziness/ headaches  [de-identified] : 80 yo female with PMH of HTN, LANDY, who arrives for an initial consult for review of MRA.  Incidental finding during work up for headache by Dr. Cabrera of Left M2 MCA abnormality. She is feeling well, denies current headaches, denies sudden severe headaches, denies seizure, nausea, vomiting, gait/balance, or weakness.  ESR/CRP 10/31/23 in [facility] - slight elevation

## 2024-04-22 NOTE — ED PROVIDER NOTE - CONDITION AT DISCHARGE:
Left voicemail for patient to return call to reschedule appointment with Aylin Kay.    Awaiting return call.    Satisfactory

## 2024-04-27 ENCOUNTER — EMERGENCY (EMERGENCY)
Facility: HOSPITAL | Age: 79
LOS: 1 days | Discharge: HOME | End: 2024-04-27
Attending: EMERGENCY MEDICINE
Payer: COMMERCIAL

## 2024-04-27 VITALS
HEIGHT: 67 IN | RESPIRATION RATE: 18 BRPM | SYSTOLIC BLOOD PRESSURE: 168 MMHG | WEIGHT: 195.11 LBS | TEMPERATURE: 98 F | HEART RATE: 100 BPM | DIASTOLIC BLOOD PRESSURE: 88 MMHG | OXYGEN SATURATION: 100 %

## 2024-04-27 VITALS
SYSTOLIC BLOOD PRESSURE: 160 MMHG | RESPIRATION RATE: 18 BRPM | HEART RATE: 76 BPM | DIASTOLIC BLOOD PRESSURE: 72 MMHG | OXYGEN SATURATION: 100 % | TEMPERATURE: 98 F

## 2024-04-27 DIAGNOSIS — D24.9 BENIGN NEOPLASM OF UNSPECIFIED BREAST: Chronic | ICD-10-CM

## 2024-04-27 LAB
A1C WITH ESTIMATED AVERAGE GLUCOSE RESULT: 5.8 % — HIGH (ref 4–5.6)
ALBUMIN SERPL ELPH-MCNC: 3.8 G/DL — SIGNIFICANT CHANGE UP (ref 3.5–5)
ALP SERPL-CCNC: 84 U/L — SIGNIFICANT CHANGE UP (ref 40–120)
ALT FLD-CCNC: 20 U/L DA — SIGNIFICANT CHANGE UP (ref 10–60)
ANION GAP SERPL CALC-SCNC: 6 MMOL/L — SIGNIFICANT CHANGE UP (ref 5–17)
APPEARANCE UR: CLEAR — SIGNIFICANT CHANGE UP
AST SERPL-CCNC: 17 U/L — SIGNIFICANT CHANGE UP (ref 10–40)
BASOPHILS # BLD AUTO: 0.04 K/UL — SIGNIFICANT CHANGE UP (ref 0–0.2)
BASOPHILS NFR BLD AUTO: 0.6 % — SIGNIFICANT CHANGE UP (ref 0–2)
BILIRUB SERPL-MCNC: 0.4 MG/DL — SIGNIFICANT CHANGE UP (ref 0.2–1.2)
BILIRUB UR-MCNC: NEGATIVE — SIGNIFICANT CHANGE UP
BUN SERPL-MCNC: 12 MG/DL — SIGNIFICANT CHANGE UP (ref 7–18)
CALCIUM SERPL-MCNC: 9.3 MG/DL — SIGNIFICANT CHANGE UP (ref 8.4–10.5)
CHLORIDE SERPL-SCNC: 103 MMOL/L — SIGNIFICANT CHANGE UP (ref 96–108)
CHOLEST SERPL-MCNC: 211 MG/DL — HIGH
CO2 SERPL-SCNC: 31 MMOL/L — SIGNIFICANT CHANGE UP (ref 22–31)
COLOR SPEC: YELLOW — SIGNIFICANT CHANGE UP
CREAT SERPL-MCNC: 0.83 MG/DL — SIGNIFICANT CHANGE UP (ref 0.5–1.3)
DIFF PNL FLD: NEGATIVE — SIGNIFICANT CHANGE UP
EGFR: 72 ML/MIN/1.73M2 — SIGNIFICANT CHANGE UP
EOSINOPHIL # BLD AUTO: 0.14 K/UL — SIGNIFICANT CHANGE UP (ref 0–0.5)
EOSINOPHIL NFR BLD AUTO: 2 % — SIGNIFICANT CHANGE UP (ref 0–6)
ESTIMATED AVERAGE GLUCOSE: 120 MG/DL — HIGH (ref 68–114)
GLUCOSE SERPL-MCNC: 118 MG/DL — HIGH (ref 70–99)
GLUCOSE UR QL: NEGATIVE MG/DL — SIGNIFICANT CHANGE UP
HCT VFR BLD CALC: 40.7 % — SIGNIFICANT CHANGE UP (ref 34.5–45)
HDLC SERPL-MCNC: 73 MG/DL — SIGNIFICANT CHANGE UP
HGB BLD-MCNC: 13 G/DL — SIGNIFICANT CHANGE UP (ref 11.5–15.5)
HIV 1 & 2 AB SERPL IA.RAPID: SIGNIFICANT CHANGE UP
IMM GRANULOCYTES NFR BLD AUTO: 0.3 % — SIGNIFICANT CHANGE UP (ref 0–0.9)
KETONES UR-MCNC: NEGATIVE MG/DL — SIGNIFICANT CHANGE UP
LEUKOCYTE ESTERASE UR-ACNC: NEGATIVE — SIGNIFICANT CHANGE UP
LIDOCAIN IGE QN: 55 U/L — SIGNIFICANT CHANGE UP (ref 13–75)
LIPID PNL WITH DIRECT LDL SERPL: 128 MG/DL — HIGH
LYMPHOCYTES # BLD AUTO: 1.32 K/UL — SIGNIFICANT CHANGE UP (ref 1–3.3)
LYMPHOCYTES # BLD AUTO: 18.8 % — SIGNIFICANT CHANGE UP (ref 13–44)
MCHC RBC-ENTMCNC: 27.2 PG — SIGNIFICANT CHANGE UP (ref 27–34)
MCHC RBC-ENTMCNC: 31.9 GM/DL — LOW (ref 32–36)
MCV RBC AUTO: 85.1 FL — SIGNIFICANT CHANGE UP (ref 80–100)
MONOCYTES # BLD AUTO: 0.59 K/UL — SIGNIFICANT CHANGE UP (ref 0–0.9)
MONOCYTES NFR BLD AUTO: 8.4 % — SIGNIFICANT CHANGE UP (ref 2–14)
NEUTROPHILS # BLD AUTO: 4.91 K/UL — SIGNIFICANT CHANGE UP (ref 1.8–7.4)
NEUTROPHILS NFR BLD AUTO: 69.9 % — SIGNIFICANT CHANGE UP (ref 43–77)
NITRITE UR-MCNC: NEGATIVE — SIGNIFICANT CHANGE UP
NON HDL CHOLESTEROL: 138 MG/DL — HIGH
NRBC # BLD: 0 /100 WBCS — SIGNIFICANT CHANGE UP (ref 0–0)
PH UR: 6 — SIGNIFICANT CHANGE UP (ref 5–8)
PLATELET # BLD AUTO: 259 K/UL — SIGNIFICANT CHANGE UP (ref 150–400)
POTASSIUM SERPL-MCNC: 3.2 MMOL/L — LOW (ref 3.5–5.3)
POTASSIUM SERPL-SCNC: 3.2 MMOL/L — LOW (ref 3.5–5.3)
PROT SERPL-MCNC: 8.1 G/DL — SIGNIFICANT CHANGE UP (ref 6–8.3)
PROT UR-MCNC: NEGATIVE MG/DL — SIGNIFICANT CHANGE UP
RBC # BLD: 4.78 M/UL — SIGNIFICANT CHANGE UP (ref 3.8–5.2)
RBC # FLD: 13.2 % — SIGNIFICANT CHANGE UP (ref 10.3–14.5)
SODIUM SERPL-SCNC: 140 MMOL/L — SIGNIFICANT CHANGE UP (ref 135–145)
SP GR SPEC: 1 — SIGNIFICANT CHANGE UP (ref 1–1.03)
TRIGL SERPL-MCNC: 52 MG/DL — SIGNIFICANT CHANGE UP
UROBILINOGEN FLD QL: 0.2 MG/DL — SIGNIFICANT CHANGE UP (ref 0.2–1)
WBC # BLD: 7.02 K/UL — SIGNIFICANT CHANGE UP (ref 3.8–10.5)
WBC # FLD AUTO: 7.02 K/UL — SIGNIFICANT CHANGE UP (ref 3.8–10.5)

## 2024-04-27 PROCEDURE — 80061 LIPID PANEL: CPT

## 2024-04-27 PROCEDURE — 99283 EMERGENCY DEPT VISIT LOW MDM: CPT

## 2024-04-27 PROCEDURE — 81003 URINALYSIS AUTO W/O SCOPE: CPT

## 2024-04-27 PROCEDURE — 83690 ASSAY OF LIPASE: CPT

## 2024-04-27 PROCEDURE — 83036 HEMOGLOBIN GLYCOSYLATED A1C: CPT

## 2024-04-27 PROCEDURE — 36415 COLL VENOUS BLD VENIPUNCTURE: CPT

## 2024-04-27 PROCEDURE — 99284 EMERGENCY DEPT VISIT MOD MDM: CPT

## 2024-04-27 PROCEDURE — 85025 COMPLETE CBC W/AUTO DIFF WBC: CPT

## 2024-04-27 PROCEDURE — 86703 HIV-1/HIV-2 1 RESULT ANTBDY: CPT

## 2024-04-27 PROCEDURE — 80053 COMPREHEN METABOLIC PANEL: CPT

## 2024-04-27 RX ORDER — LIDOCAINE 4 G/100G
1 CREAM TOPICAL
Qty: 3 | Refills: 0
Start: 2024-04-27 | End: 2024-05-11

## 2024-04-27 RX ORDER — VALACYCLOVIR 500 MG/1
1 TABLET, FILM COATED ORAL
Qty: 21 | Refills: 0
Start: 2024-04-27 | End: 2024-05-03

## 2024-04-27 RX ORDER — OXYCODONE AND ACETAMINOPHEN 5; 325 MG/1; MG/1
1 TABLET ORAL
Qty: 9 | Refills: 0
Start: 2024-04-27 | End: 2024-04-29

## 2024-04-27 RX ORDER — VALACYCLOVIR 500 MG/1
1000 TABLET, FILM COATED ORAL ONCE
Refills: 0 | Status: COMPLETED | OUTPATIENT
Start: 2024-04-27 | End: 2024-04-27

## 2024-04-27 RX ORDER — LIDOCAINE 4 G/100G
1 CREAM TOPICAL ONCE
Refills: 0 | Status: COMPLETED | OUTPATIENT
Start: 2024-04-27 | End: 2024-04-27

## 2024-04-27 RX ADMIN — LIDOCAINE 1 PATCH: 4 CREAM TOPICAL at 05:13

## 2024-04-27 RX ADMIN — VALACYCLOVIR 1000 MILLIGRAM(S): 500 TABLET, FILM COATED ORAL at 05:12

## 2024-04-27 NOTE — ED ADULT NURSE NOTE - OBJECTIVE STATEMENT
Patient presented to ED c/o left upper buttock and coccyx area lesions, painful upon touch per patient. no discharge. Denies fever. mild pain on left side of neck. No dysuria.

## 2024-04-27 NOTE — ED ADULT TRIAGE NOTE - CHIEF COMPLAINT QUOTE
pt reports that she has abscess in the  buttocks  left side of the grove , it hurts , itching and painful  , started today

## 2024-04-27 NOTE — ED ADULT NURSE NOTE - NSFALLUNIVINTERV_ED_ALL_ED
Bed/Stretcher in lowest position, wheels locked, appropriate side rails in place/Call bell, personal items and telephone in reach/Instruct patient to call for assistance before getting out of bed/chair/stretcher/Non-slip footwear applied when patient is off stretcher/[place] to call system/Physically safe environment - no spills, clutter or unnecessary equipment/Purposeful proactive rounding/Room/bathroom lighting operational, light cord in reach

## 2024-04-27 NOTE — ED ADULT NURSE NOTE - CAS EDN DISCHARGE ASSESSMENT
Elevated PSA Office Visit      Patient Name: Jay Saunders  : 1966   MRN: [id number]     Chief Complaint:  Elevated PSA.    Chief Complaint   Patient presents with    Elevated PSA       Referring Provider: Argentina Osborn PA-C    History of Present Illness: Jay Saunders is a 57 y.o. male who presents today with history of elevated PSA and LUTS. He reports weak stream with delayed emptying, nocturia 2-3 times a night, urgency and frequency.     He is referred for elevated PSA. PSA has jumped to 4.92.   He denies family history of prostate cancer.     Lab Results   Component Value Date    PSA 4.920 (H) 2023    PSA 2.980 2022    PSA 2.140 2021        Prostate Biopsy Collaborative Group (PBCG) Risk Calculator:         He has never trialed PDE 5 inhibitors.     He has an enlarged smooth gland.   Recommend MRI prostate    Subjective      Review of System: Review of Systems   Genitourinary:  Positive for frequency and urgency.      I have reviewed the ROS documented by my clinical staff, I have updated appropriately and I agree. Ananth San MD    Past Medical History:   Past Medical History:   Diagnosis Date    Acromioclavicular separation     Acute maxillary sinusitis     Ankle sprain     Anxiety     Arthritis of back     Arthritis of neck     Bursitis of hip     Cervical disc disorder     Dislocation, shoulder     Essential hypertension, benign     Fracture of hip     Fracture of wrist     Fracture, femur     Fracture, finger     Fracture, humerus     Hyperlipidemia     Knee swelling     Low back strain     Lumbar spondylosis     Lumbosacral disc disease     Neuroma of foot     Osteoarthritis     Periarthritis of shoulder     Rotator cuff syndrome     Routine general medical examination at a health care facility     Shoulder pain     Special screening for malignant neoplasm of prostate     Spinal stenosis     Tear of meniscus of knee     Thoracic disc disorder     Wrist  sprain        Past Surgical History:   Past Surgical History:   Procedure Laterality Date    BACK SURGERY      EYE SURGERY      HAND SURGERY      HIP SURGERY      JOINT REPLACEMENT      MOUTH SURGERY      SHOULDER SURGERY      VASECTOMY      WRIST SURGERY         Family History:   Family History   Problem Relation Age of Onset    Thyroid disease Mother     Hypertension Father     Diabetes Father     Diabetes Maternal Grandmother     Cancer Paternal Grandmother        Social History:   Social History     Socioeconomic History    Marital status:    Tobacco Use    Smoking status: Former     Packs/day: 1.00     Years: 10.00     Additional pack years: 0.00     Total pack years: 10.00     Types: Cigarettes     Start date: 1985     Quit date: 10/13/1995     Years since quittin.2    Smokeless tobacco: Never   Vaping Use    Vaping Use: Never used   Substance and Sexual Activity    Alcohol use: No     Comment: 10/13/1995    Drug use: No    Sexual activity: Yes     Partners: Female     Birth control/protection: Post-menopausal, Vasectomy, Hysterectomy       Medications:     Current Outpatient Medications:     citalopram (CeleXA) 20 MG tablet, Take 1 tablet by mouth Daily., Disp: 90 tablet, Rfl: 3    diclofenac (VOLTAREN) 75 MG EC tablet, Take 1 tablet by mouth 2 (Two) Times a Day., Disp: 180 tablet, Rfl: 1    dilTIAZem CD (CARDIZEM CD) 240 MG 24 hr capsule, Take 1 capsule by mouth Daily., Disp: 90 capsule, Rfl: 1    lisinopril-hydrochlorothiazide (PRINZIDE,ZESTORETIC) 20-12.5 MG per tablet, Take 2 tablets by mouth Daily., Disp: 180 tablet, Rfl: 1    pantoprazole (PROTONIX) 40 MG EC tablet, Take 1 tablet by mouth Daily., Disp: 90 tablet, Rfl: 3    simvastatin (ZOCOR) 40 MG tablet, Take 1 tablet by mouth Daily., Disp: 90 tablet, Rfl: 3    tiZANidine (ZANAFLEX) 2 MG tablet, Take 1 tablet by mouth Every 8 (Eight) Hours As Needed for Muscle Spasms., Disp: 30 tablet, Rfl: 1    ondansetron ODT (ZOFRAN-ODT) 8 MG  disintegrating tablet, Place 1 tablet on the tongue Every 8 (Eight) Hours As Needed for Nausea or Vomiting. (Patient not taking: Reported on 1/22/2024), Disp: 20 tablet, Rfl: 0    tamsulosin (FLOMAX) 0.4 MG capsule 24 hr capsule, Take 1 capsule by mouth Daily., Disp: 90 capsule, Rfl: 1    Allergies:   No Known Allergies    IPSS Questionnaire (AUA-7):  Over the past month…    1)  Incomplete Emptying:       How often have you had a sensation of not emptying you had the sensation of not emptying your bladder completely after you finished urinating?  2 - Less than half the time   2)  Frequency:       How often have you had the urinate again less than two hours after you finished urinating?  4 - More than half the time   3)  Intermittency:       How often have you found you stopped and started again several times when you urinated?   4 - More than half the time   4) Urgency:      How often have you found it difficult to postpone urination?  0 - Not at all   5) Weak Stream:      How often have you had a weak urinary stream?  0 - Not at all   6) Straining:       How often have you had to push or strain to begin urination?  1 - Less than 1 time in 5   7) Nocturia:      How many times did you most typically get up to urinate from the time you went to bed at night until the time you got up in the morning?  2 - 2 times   Total Score:  13   The International Prostate Symptom Score (IPSS) is used to screen, diagnose, track symptoms of benign prostatic hyperplasia (BPH).   0-7 (Mild Symptoms) 8-19 (Moderate) 20-35 (Severe)   Quality of Life (QoL):  If you were to spend the rest of your life with your urinary condition just the way it is now, how would you feel about that? 3-Mixed   Urine Leakage (Incontinence) 0-No Leakage       Sexual Health Inventory for Men (KAYE)   Over the past 6 months:     1. How do you rate your confidence that you could get and keep an erection?  2 - Low   2. When you had erections with sexual     "stimulation, how often were your erections hard enough for penetration (entering your partner)?  4 - Most times ( much more than, half the time)   3. During sexual intercourse, how often were you able to maintain your erection after you had penetrated (entered) your partner?  4 - Most times ( much more than, half the time)   4. During sexual intercourse, how difficult was it to maintain your erection to completion of intercourse?  3 - Difficult    5. When you attempted sexual intercourse, how often was it satisfactory for you?  5 - Almost always or always     Total Score: 18   The Sexual Health Inventory for Men further classifies ED severity with the following breakpoints:   1-7 (Severe ED) 8-11 (Moderate ED) 12-16 (Mild to Moderate ED) 17-21 (Mild ED)             Objective     Physical Exam:   Vital Signs:   Vitals:    01/22/24 1407   Pulse: 79   SpO2: 97%   Weight: 110 kg (242 lb)   Height: 177.8 cm (70\")   PainSc: 0-No pain     Body mass index is 34.72 kg/m².     Physical Exam  Vitals and nursing note reviewed.   Constitutional:       Appearance: Normal appearance.   HENT:      Head: Normocephalic and atraumatic.      Mouth/Throat:      Mouth: Mucous membranes are moist.      Pharynx: Oropharynx is clear.   Eyes:      Extraocular Movements: Extraocular movements intact.      Conjunctiva/sclera: Conjunctivae normal.   Cardiovascular:      Rate and Rhythm: Normal rate and regular rhythm.   Pulmonary:      Effort: Pulmonary effort is normal. No respiratory distress.   Abdominal:      Palpations: Abdomen is soft.      Tenderness: There is no abdominal tenderness. There is no right CVA tenderness or left CVA tenderness.   Genitourinary:     Comments:  Circumcised phallus, orthotopic meatus, bilaterally descended testicles without masses, or lesions.     VIKAS: Normal rectal tone, no blood, estimated 50+ gram prostate without nodularity or firmness.   Musculoskeletal:         General: Normal range of motion.      " Cervical back: Normal range of motion.   Skin:     General: Skin is warm and dry.   Neurological:      General: No focal deficit present.      Mental Status: He is alert and oriented to person, place, and time.   Psychiatric:         Mood and Affect: Mood normal.         Behavior: Behavior normal.         Labs:   Hematocrit (%)   Date Value   12/19/2023 47.5   05/31/2023 48.1   04/19/2022 49.5   03/31/2021 48.5   10/27/2016 46.8   03/17/2015 45.6   10/09/2014 47.5       Lab Results   Component Value Date    PSA 4.920 (H) 12/19/2023    PSA 2.980 04/19/2022    PSA 2.140 03/31/2021       Images:   No Images in the past 120 days found..    Measures:   Tobacco:   Jay Saunders  reports that he quit smoking about 28 years ago. His smoking use included cigarettes. He started smoking about 39 years ago. He has a 10.00 pack-year smoking history. He has never used smokeless tobacco..    Assessment / Plan      Assessment/Plan:   Mr. Saunders is a 57 y.o. male with elevated PSA. This is a new diagnosis of uncertain prognosis.     I had a detailed discussion with the patient today regarding prostate-specific antigen (PSA) and prostate cancer screening.  We discussed that PSA is an imperfect screening test and that it can be elevated for a variety of reasons including infection, inflammation, as a function of the prostate volume, and due to malignancy.  We discussed how prostate cancer is the most commonly diagnosed malignancy among men and becomes more prevalent at advanced age.  We discussed that patients with a family history of prostate cancer are at an increased risk of developing PCa over the general population.  I counseled the patient that the American Urological Association guidelines recommend PSA screening in men aged 55 through 70, or in some instances at an earlier age if positive family history for prostate cancer. Screening men older than 70 years old is not recommended, unless patient in good health with > 10-year  "life expectancy.  Men older than 70, in poor health with less than 10-year life expectancy are more likely to pass from causes unrelated to PCa.  Discussed that PCa is typically a slow-growing malignancy, and identifying intermediate or high risk prostate cancers that need prompt treatment is the ultimate goal of PSA and prostate cancer screening.  I discussed with this patient that there is no \"normal\" PSA range despite the fact that most labs indicate a \"normal\" PSA range from 0 to 4 ng/dL.  There are age-adjusted PSA ranges that are also taken into account in the setting of slightly elevated PSA in the older male.  Besides age-adjusted ranges, calculating a patient's prostate volume to determine PSA density possible with MRI (<0.15 has a lower risk of harboring malignancy), calculating the patient's PSA velocity or doubling time, and evaluating free PSA versus total PSA values can also help guide decision making for biopsy.      I discussed the option of performing adjunctive tests, my preference is to perform a [employer] Mdx urine screening test, which can provide accurate percentage risk of harboring clinically significant PCa that may need treatment based on expressed prostate cell genetics.  This is beneficial in assisting with decision-making in patients who are hesitant to undergo prostate biopsy. I discussed that I prefer to perform a prostate MRI in patients prior to biopsy (when indicated) to assess for discrete lesions that may require direct targeting on biopsy, to prevent a falsely negative prostate biopsy. This is possible with available [employer] imaging software and Brain in Hand US/MRI fusion biopsy platform.     My goal is to work-up the appropriate patient for elevated PSA as prostate biopsy and work-up for PCa has inherent risks and potential harms.  The risk of prostate biopsy related sepsis is 3-4%.    I discussed that PSA screening and work-up for prostate cancer should only ever occur after \"shared " "decision making\" conversation between the physician and patient.  He was given appropriate counseling and all of his questions were answered. Patient reports understanding.    Discussion Summary   Rising PSA fairly rapidly over the last year.  Enlarged prostate with lower urinary tract symptoms.  We will initiate empiric tamsulosin, risks of hypotension and retrograde ejaculation discussed.  I recommend MRI prostate for further evaluation especially given this patient's young age and rapidly rising PSA.  Based on MRI findings we may recommend a fusion targeted biopsy.  He reports understanding and is agreeable with plan.    Diagnoses and all orders for this visit:    1. Elevated prostate specific antigen (PSA)  -     MRI Prostate With & Without Contrast; Future  -     Creatinine, Serum; Future    2. BPH with obstruction/lower urinary tract symptoms  -     tamsulosin (FLOMAX) 0.4 MG capsule 24 hr capsule; Take 1 capsule by mouth Daily.  Dispense: 90 capsule; Refill: 1              Follow Up:   Return in about 3 weeks (around 2/12/2024).    I spent approximately 45 minutes providing clinical care for this patient; including review of patient's chart and provider documentation, face to face time spent with patient in examination room (obtaining history, performing physical exam, discussing diagnosis and management options), placing orders, and completing patient documentation.     Ananth San MD  [facility] [employer]   " Alert and oriented to person, place and time/Patient baseline mental status

## 2024-04-27 NOTE — ED PROVIDER NOTE - NSFOLLOWUPINSTRUCTIONS_ED_ALL_ED_FT
Your rash appears to be shingles.    Take Valacyclovir 1 g 3 times a day for 7 days    For pain,   1. Lidocaine patches once a day   2. Naproxen 500 mg twice a day if needed  3 .PErcocets if severe pain  4. Google online for additional home remedies
Resident

## 2024-04-27 NOTE — ED ADULT NURSE NOTE - PRO INTERPRETER NEED 2
Fluids complete, PIV removed, D/C per orders, ambulated from ED Without difficulty.        Mando Gutierrez RN  10/22/23 3549 English

## 2024-04-27 NOTE — ED PROVIDER NOTE - PHYSICAL EXAMINATION
General: Well appearing, no acute distress, appears stated age  HEENT: normocephalic, atraumatic   Respiratory: normal work of breathing  MSK: no swelling or tenderness of lower extremities, moving all extremities spontaneously   Skin: warm, dry, 5 mm cluster of vesicles x 2 at leftbuttock

## 2024-04-27 NOTE — ED PROVIDER NOTE - PATIENT PORTAL LINK FT
You can access the FollowMyHealth Patient Portal offered by [facility] by registering at the following website: [email or web address]. By joining [email or web address]’s FollowMyHealth portal, you will also be able to view your health information using other applications (apps) compatible with our system.

## 2024-04-27 NOTE — ED ADULT NURSE NOTE - NURSING MUSC STRENGTH
Call to patient. She states Dr. Mir Carrillo told her that I would be the contact information for the biopsy she ordered on 6/21/19 and I would give her additional information on how to proceed. Informed patient that I was not aware of this and would need to do some follow up. Will call patient back when I have more information.  Omar Sloan
Requesting results of biopsy,performed yesterday at ,was told mrs mariano would give to her
hand grasp, leg strength strong and equal bilaterally

## 2024-06-10 ENCOUNTER — NON-APPOINTMENT (OUTPATIENT)
Age: 79
End: 2024-06-10

## 2024-06-11 ENCOUNTER — APPOINTMENT (OUTPATIENT)
Dept: NEUROLOGY | Facility: CLINIC | Age: 79
End: 2024-06-11
Payer: MEDICARE

## 2024-06-11 VITALS — SYSTOLIC BLOOD PRESSURE: 134 MMHG | DIASTOLIC BLOOD PRESSURE: 74 MMHG

## 2024-06-11 VITALS
TEMPERATURE: 97.1 F | WEIGHT: 187 LBS | DIASTOLIC BLOOD PRESSURE: 77 MMHG | OXYGEN SATURATION: 98 % | BODY MASS INDEX: 29.35 KG/M2 | SYSTOLIC BLOOD PRESSURE: 152 MMHG | HEART RATE: 91 BPM | HEIGHT: 67 IN

## 2024-06-11 DIAGNOSIS — H81.4 VERTIGO OF CENTRAL ORIGIN: ICD-10-CM

## 2024-06-11 DIAGNOSIS — G43.809 OTHER MIGRAINE, NOT INTRACTABLE, W/OUT STATUS MIGRAINOSUS: ICD-10-CM

## 2024-06-11 PROCEDURE — 99214 OFFICE O/P EST MOD 30 MIN: CPT

## 2024-06-11 RX ORDER — TRIAMTERENE AND HYDROCHLOROTHIAZIDE 25; 37.5 MG/1; MG/1
37.5-25 TABLET ORAL
Refills: 0 | Status: ACTIVE | COMMUNITY

## 2024-06-11 RX ORDER — AMLODIPINE BESYLATE 5 MG/1
5 TABLET ORAL
Refills: 0 | Status: ACTIVE | COMMUNITY

## 2024-06-11 RX ORDER — RIBOFLAVIN (VITAMIN B2) 400 MG
400 TABLET ORAL
Qty: 100 | Refills: 3 | Status: ACTIVE | COMMUNITY
Start: 2024-06-11 | End: 1900-01-01

## 2024-06-11 RX ORDER — MAGNESIUM OXIDE 241.3 MG/1000MG
400 TABLET ORAL DAILY
Qty: 100 | Refills: 3 | Status: ACTIVE | COMMUNITY
Start: 2024-06-11 | End: 1900-01-01

## 2024-06-11 NOTE — HISTORY OF PRESENT ILLNESS
[FreeTextEntry1] : July 12 2023 went to the ER [facility] because of a very unusual striking sensation that took hold of her one night when walking back from the bathroom. It lasted 10 seconds and did not recur. Hospital records - no stroke and minor vertebral arterial abnormality. Has had episodes of unsteadiness since then "turning could trigger a fall". Denies abnormal sensations, numbness, tingling, weakness following hospital visit.  6/11/2024: She continues to report a sensation of unsteadiness and abnormal balance.  She is completed tests and would like a review of her test results.  She saw Dr. Huffman, the neurointerventionist who did not find any need for intervention.  She does admit to an ache in the back of the head like a neuralgic pain that she attributes to position of her head or cannot explain it.  It is infrequent but does remind her of its presence every now and then.

## 2024-06-11 NOTE — PHYSICAL EXAM
[Person] : oriented to person [Place] : oriented to place [Time] : oriented to time [Concentration Intact] : normal concentrating ability [Visual Intact] : visual attention was ~T not ~L decreased [Naming Objects] : no difficulty naming common objects [Repeating Phrases] : no difficulty repeating a phrase [Writing A Sentence] : no difficulty writing a sentence [Fluency] : fluency intact [Comprehension] : comprehension intact [Reading] : reading intact [Past History] : adequate knowledge of personal past history [Cranial Nerves Optic (II)] : visual acuity intact bilaterally,  visual fields full to confrontation, pupils equal round and reactive to light [Cranial Nerves Oculomotor (III)] : extraocular motion intact [Cranial Nerves Trigeminal (V)] : facial sensation intact symmetrically [Cranial Nerves Facial (VII)] : face symmetrical [Cranial Nerves Vestibulocochlear (VIII)] : hearing was intact bilaterally [Cranial Nerves Glossopharyngeal (IX)] : tongue and palate midline [Cranial Nerves Accessory (XI - Cranial And Spinal)] : head turning and shoulder shrug symmetric [Cranial Nerves Hypoglossal (XII)] : there was no tongue deviation with protrusion [Motor Strength] : muscle strength was normal in all four extremities [No Muscle Atrophy] : normal bulk in all four extremities [Sensation Tactile Decrease] : light touch was intact [Balance] : balance was intact [Past-pointing] : there was no past-pointing [Tremor] : no tremor present [2+] : Ankle jerk left 2+ [Plantar Reflex Right Only] : normal on the right [Plantar Reflex Left Only] : normal on the left [Sclera] : the sclera and conjunctiva were normal [PERRL With Normal Accommodation] : pupils were equal in size, round, reactive to light, with normal accommodation [Outer Ear] : the ears and nose were normal in appearance [Extraocular Movements] : extraocular movements were intact [Oropharynx] : the oropharynx was normal [Neck Appearance] : the appearance of the neck was normal [Neck Cervical Mass (___cm)] : no neck mass was observed [Jugular Venous Distention Increased] : there was no jugular-venous distention [Thyroid Diffuse Enlargement] : the thyroid was not enlarged [Thyroid Nodule] : there were no palpable thyroid nodules [Auscultation Breath Sounds / Voice Sounds] : lungs were clear to auscultation bilaterally [Heart Rate And Rhythm] : heart rate was normal and rhythm regular [Heart Sounds] : normal S1 and S2 [Heart Sounds Gallop] : no gallops [Murmurs] : no murmurs [Heart Sounds Pericardial Friction Rub] : no pericardial rub [Full Pulse] : the pedal pulses are present [Edema] : there was no peripheral edema [Bowel Sounds] : normal bowel sounds [Abdomen Soft] : soft [Abdomen Tenderness] : non-tender [Abdomen Mass (___ Cm)] : no abdominal mass palpated [No CVA Tenderness] : no ~M costovertebral angle tenderness [No Spinal Tenderness] : no spinal tenderness [Abnormal Walk] : normal gait [Nail Clubbing] : no clubbing  or cyanosis of the fingernails [Musculoskeletal - Swelling] : no joint swelling seen [Motor Tone] : muscle strength and tone were normal [Skin Color & Pigmentation] : normal skin color and pigmentation [Skin Turgor] : normal skin turgor [] : no rash

## 2024-06-11 NOTE — DISCUSSION/SUMMARY
[FreeTextEntry1] : I reviewed her MRI MRA and MRV scans images with her.  I agree with the reports.  They are normal.  I reviewed the blood tests.  There is no significant abnormality of the sedimentation rate CRP.  Today she demonstrated good ability to walk tandem stand on one leg with negative Romberg sign.  She confirmed intermittent pain in the back of the head and neck, although not severe and infrequent,  she could not explain by trauma or position or strain.  Her symptoms and signs are consistent with vestibular migraine or vestibular disorder of center origin.  We discussed nonpharmacological methods with exercise (walking 2 miles a day), drinking sufficient amount of water-1-1/2 L a day, and using magnesium oxide 400 mg daily as well as riboflavin 4 mg daily.  Should this not stop her symptoms or decrease it to the level that she could tolerate, she can use Depakote  mg daily for a month and discontinue it if blood test done after months show abnormal liver functions.  (Even if she uses the medications she is encouraged to continue magnesium and riboflavin hydration and exercise walking 2 miles a day) follow-up is arranged after 6 months or earlier as need be with diary of her symptoms.

## 2024-07-01 NOTE — ED ADULT NURSE NOTE - PAIN: PRESENCE, MLM
\[facility]\"" Progress Note    Date: 2024    Name: Angelica Duncan    MRN: [id number]         : 1950    [facility] + Dapto IVP    Ms. Duncan to \[facility]\"", ambulatory at 1048 via seated walker with significant other at side. Ms. Duncan was assessed and education was provided. Ms. Duncan stated she is tolerating her antibiotics well.     Ms. Duncan's vitals were reviewed.  Vitals:    24 1134   BP: (!) 107/52   Pulse: 76   Resp: 16   Temp: 98.4 °F (36.9 °C)   SpO2: 99%     Right double lumen upper arm PICC flushed easily and had brisk blood return via purple port, purple line flushed with 6ml NS and 6ml NS wasted. Labs obtained per MD order CBC, BMP, CK, CRP)  Recent Results (from the past 12 hour(s))   CBC with Partial Differential    Collection Time: 24 11:12 AM   Result Value Ref Range    WBC 5.7 4.5 - 13.0 K/uL    RBC 3.71 (L) 4.10 - 5.10 M/uL    Hemoglobin 11.4 (L) 12.0 - 16.0 g/dL    Hematocrit 35.3 (L) 36 - 48 %    MCV 95.1 78 - 102 FL    MCH 30.7 25.0 - 35.0 PG    MCHC 32.3 31 - 37 g/dL    RDW 13.9 11.5 - 14.5 %    Platelets 309 140 - 440 K/uL    Neutrophils % 68 40 - 70 %    Mixed Cells 7 0.1 - 17 %    Lymphocytes % 25 14 - 44 %    Neutrophils Absolute 3.9 1.8 - 9.5 K/UL    ABSOLUTE MIXED CELLS 0.4 0.0 - 2.3 K/uL    Lymphocytes Absolute 1.4 1.1 - 5.9 K/UL    Differential Type AUTOMATED     Basic Metabolic Panel    Collection Time: 24 11:12 AM   Result Value Ref Range    Sodium 141 136 - 145 mmol/L    Potassium 3.8 3.5 - 5.5 mmol/L    Chloride 106 100 - 111 mmol/L    CO2 29 21 - 32 mmol/L    Anion Gap 6 3.0 - 18 mmol/L    Glucose 199 (H) 74 - 99 mg/dL    BUN 25 (H) 7.0 - 18 MG/DL    Creatinine 0.83 0.6 - 1.3 MG/DL    BUN/Creatinine Ratio 30 (H) 12 - 20      Est, Glom Filt Rate 75 >60 ml/min/1.73m2    Calcium 9.2 8.5 - 10.1 MG/DL   CK    Collection Time: 24 11:12 AM   Result Value Ref Range    Total  26 - 192 U/L       PICC dressing clean,dry and intact today. No swelling, 
complains of pain/discomfort

## 2024-11-06 NOTE — ED ADULT NURSE NOTE - NS TRANSFER EYEGLASSES PAIRS
[facility]  UROLOGY  ________________________________________________    OUTPATIENT NOTE    Patient: Nathan Dominguez    : 1948  MRN: [id number]           HISTORY OF PRESENT ILLNESS     Nathan Dominguez is a 76 year old with history of OAB (non bothersome symptoms) and prostate cancer s/p transrectal ultrasound guided prostate needle biopsy on 9/15/23 with surgical pathology revealing valerie 4+5 adenocarcinoma of the prostate in 8 cores and 4+3 to lesser degree for a prebiopsy PSA of 7.96 ng/mL and volume of 34.2 cm3. CT was negative for metastatic disease. Bone scan revealed suspicious findings in spine. MRI reveals findings consistent with degenerative changes, no metastatic disease. He completed radiation on 24. He is here for PSA and Lupron injection. He has mild hot flashes, fatigue from lupron. These s/s are improving. He is tolerating lupron. No other complaint.  Recommend follow-up in 6 months with PSA and Lupron    Patient also here for one month medication follow up after trial of combo therapy (sildenafil 100mg and tadalafil 20mg).      ADT start date: 2023 (end date around )  Last injection:    24  Next due date:  24     PSA HX:  10/31/24: <0.01  24: <0.01  24: <0.01  23: 7.96  22: 5.35  21: 2.95      Pertinent labs:  No results found for this visit on 24.  BLDR Scan mLs   Date Value Ref Range Status   10/02/2023 5mL  Final         REVIEW OF SYSTEMS     All systems are negative unless otherwise stated in the HPI  ROS        PAST HISTORY      Past Medical History:   Diagnosis Date    DM2 (diabetes mellitus, type 2)  (CMD)     Essential hypertension 2019    Murmur 2019    SLIGHT MURMUR    Other chest pain 2019    Prostate cancer  (CMD)        Past Surgical History:   Procedure Laterality Date    Fracture surgery      LEFT ARM FRACTURE SURGERY. AS CHILD    Open access colonoscopy  2018    repeat 3-5  years       Family History   Problem Relation Age of Onset    Stroke Father     Hypertension Father     Cancer Sister     Cancer Sister          Tobacco Use: Never             Alcohol Use: Yes                Comment: OCCASSIONAL      Drug Use:    Never                 MEDICATIONS AND ALLERGIES        Current Outpatient Medications   Medication Sig Dispense Refill    tadalafil (CIALIS) 20 MG tablet Take 1 tablet by mouth daily as needed for Erectile Dysfunction. 30 tablet 0    losartan-hydrochlorothiazide (HYZAAR) 100-25 MG per tablet TAKE 1 TABLET BY MOUTH DAILY 90 tablet 1    sildenafil (VIAGRA) 100 MG tablet Take 1 tablet by mouth daily as needed for Erectile Dysfunction. 30 tablet 0    Glucose Blood (BLOOD GLUCOSE TEST STRIPS) Strip Test blood glucose once a day- fasting or 2 hours after a meal 100 strip 3    Lancets (OneTouch Delica Plus [id number]) Misc 1 each daily. Test blood glucose once a day- fasting or 2 hours after a meal 100 each 1    Multiple Vitamin (MULTI-DAY PO)        No current facility-administered medications for this visit.       Medications were reviewed and updated today.      ALLERGIES  ALLERGIES:  No Known Allergies        PHYSICAL EXAM AND VITAL SIGNS       Visit Vitals  /70   Pulse 78   Resp 18   Ht 5' 10\" (1.778 m)   Wt 84.4 kg (186 lb)   BMI 26.69 kg/m²            General: in no acute distress.   HNT: normocephalic, neck supple, atraumatic  Chest: nonlabored respirations, symmetric chest expansion  Abdomen: soft, nt, nd  : no cvat bilaterally  Neuro: alert and oriented  Affect: appropriate      LABORATORY     No results found for this visit on 11/06/24.    BLDR Scan mLs   Date Value Ref Range Status   10/02/2023 5mL  Final         Lab Services on 10/31/2024   Component Date Value Ref Range Status    Prostate Specific Antigen 10/31/2024 <0.01  <=6.50 ng/mL Final    S[employer] Vista Chemiluminescence. Results obtained with different assay methods or kits cannot be used  interchangeably.           LAST HCT:    HCT (%)   Date Value   08/01/2024 34.5 (L)       LAST CREATININE:    Creatinine (mg/dL)   Date Value   05/20/2024 0.98             PATHOLOGY         IMAGING       LAST CT:  === 04/30/24 ===    CT HEAD WO CONTRAST    - Narrative -  PROCEDURE: CT HEAD WO CONTRAST    CLINICAL INDICATION: Head injury, pain    TECHNIQUE: Axial CT images of the head were obtained. Adjustment of the mA  and/or kV was done based on the patient's size.    CONTRAST: None.    COMPARISON: PET/CT 10/19/2023    FINDINGS:    INTRACRANIAL: Mild parenchymal volume loss with minimal chronic  microvascular ischemic changes. No acute intracranial hemorrhage,  extra-axial collection, mass effect, or herniation.  No hydrocephalus.    SINUSES: Visualized paranasal sinuses and mastoids are essentially clear.    ORBITS: Visualized orbits are within normal limits.    CALVARIUM/SCALP: Calvarium and skull base are intact. No acute findings  identified in the scalp soft tissues.    - Impression -  1. No acute intracranial findings.  2. Mild parenchymal volume loss with minimal chronic microvascular ischemic  changes.    Electronically Signed by: DANK JACKSON M.D.  Signed on: 4/30/2024 2:14 PM  Workstation ID: [id number]      === 12/18/23 ===    CT SOFT TISSUE OVERREAD    - Narrative -  PROCEDURE: CT SOFT TISSUE OVERREAD    CLINICAL INDICATION: Prostate cancer.    TECHNIQUE: Axial CT images were obtained of the pelvis for radiation  treatment planning. Adjustment of the mA and/or kV was done based on the  patient's size.    CONTRAST: 75 mL Omnipaque 350. Oral contrast was also administered.    COMPARISON: [facility] PET/CT 10/19/2023. CT abdomen/pelvis 9/27/2023.    FINDINGS:    Exam performed for radiation treatment planning.    Prostate is included on images 106-122 series 2. Prostate calcifications  are noted.    Mild to moderate aortoiliac atherosclerotic calcifications. Moderate to  large colonic stool burden. Mild  scattered degenerative osseous findings.    - Impression -  Exam performed for radiation treatment planning. Please see  above discussion.      Electronically Signed by: DANK JACKSON M.D.  Signed on: 12/18/2023 3:26 PM  Workstation ID: [id number]      === 09/27/23 ===    CT ABDOMEN PELVIS W CONTRAST    - Narrative -  EXAMINATION: CT ABDOMEN PELVIS W CONTRAST.    CLINICAL INDICATION: Metastatic disease evaluation. History of prostate  cancer.    COMPARISON: None    TECHNIQUE: Multiple axial images of the abdomen and pelvis were obtained  from the lung bases to the ischial tuberosities, following administration  of intravenous contrast material. In addition, coronal reformatted images  were obtained.  Adjustment of the mA and/or kV was done based on the patient's size.    CONTRAST: 75 cc Omnipaque 350    FINDINGS: Evaluation of the lung bases show no infiltrates or pleural  effusions. Heart is normal in size.    The liver is homogeneous, no calcified gallstones are seen. There is no  biliary obstruction.    The spleen, pancreas and adrenal glands are normal. Kidneys enhance  symmetrically. No hydronephrosis or stones are seen. Abdominal aorta is  normal in caliber. No retroperitoneal or pelvic adenopathy is seen.    The bowel loops show no bowel obstruction. Constipation is noted. No focal  inflammatory changes is seen. No abscess or free air is noted.    The bones are unremarkable.    - Impression -  No solid mass or adenopathy. Constipation. No bowel obstruction  or focal inflammatory changes. No renal stones or hydronephrosis. No  evidence of metastasis on this study.              Electronically Signed by: ERIK DERAS MD  Signed on: 9/27/2023 11:12 AM  Workstation ID: [id number]    LAST X-RAY:  === 04/30/24 ===    XR HAND 3 OR MORE VIEWS RIGHT    - Narrative -  EXAMINATION: XR WRIST 3 OR MORE VIEWS RIGHT, XR HAND 3 OR MORE VIEWS RIGHT.    CLINICAL INDICATION: r wrist and hand pain.  Pain    COMPARISON: None.    4 views right wrist and 3 views right hand were obtained.    The wrist shows no evidence of fracture or dislocation. Bony structures are  intact. The hand shows no evidence of fracture or significant degenerative  changes. No arthritic changes are appreciated.    - Impression -  No significant degenerative changes are seen. No fracture is  appreciated..    Electronically Signed by: ERIK DERAS MD  Signed on: 4/30/2024 1:07 PM  Workstation ID: [id number]    ___________________________________________________________________________    XR WRIST 3 OR MORE VIEWS RIGHT    - Narrative -  EXAMINATION: XR WRIST 3 OR MORE VIEWS RIGHT, XR HAND 3 OR MORE VIEWS RIGHT.    CLINICAL INDICATION: r wrist and hand pain. Pain    COMPARISON: None.    4 views right wrist and 3 views right hand were obtained.    The wrist shows no evidence of fracture or dislocation. Bony structures are  intact. The hand shows no evidence of fracture or significant degenerative  changes. No arthritic changes are appreciated.    - Impression -  No significant degenerative changes are seen. No fracture is  appreciated..    Electronically Signed by: ERIK DERAS MD  Signed on: 4/30/2024 1:07 PM  Workstation ID: [id number]    LAST U/S:  No results found for this or any previous visit.      ASSESSMENT     1. Malignant neoplasm of prostate  (CMD)        - Sildenafil 100mg with some improvement  - Wishes trial combination theapy Sildenafil 100mg with Cialis 20mg. Has both prescriptions but has not had a chance to try them yet.    Joesph grade: (4+3)7 and (4+5)9 in 8 cores  Prebiopsy PSA: 7.96  Prostate volume: 34.2     - CT and bone scan results.   - MRI results:  MRI reveals findings consistent with degenerative changes, no metastatic disease.     -PSMA scan:  No evidence of metastatic disease on the basis of this exam.  No abnormal activity in lymph nodes.  No abnormal activity in the bones.     - completed  radiation on 02/24  - 1st injection given on 11/23. End date around 11/2025  - 6 month Lupron administered today  - Next Lupron due May 2025    - PSA result reviewed with patient and trend appropriate. Continue PSA surveillance as directed. Will combine PSA & Lupron appointments.    - PSA and Lupron in 6 months (May 2025)       PLAN       Orders Placed This Encounter    PSA     Order Specific Question:   How should test results be released to the patient's [place] portal?     Answer:   Automatic Release [100002]    leuprolide (6 Month) (LUPRON DEPOT) injection 45 mg          Return in about 6 months (around 5/6/2025) for PSA and Lupron injection.      _____________________________________________________      Scribe Signature:  I, Anna Gonzalez CMA, personally scribed the services dictated to me by Benny Miller M.D. in this documentation. Scribe Attestation: The documentation recorded by the scribe accurately reflects the service I personally performed and the decision made by me, Benny Miller MD.            1 pair

## 2024-11-16 NOTE — ED ADULT NURSE NOTE - TEMPLATE
Medication: Metformin   Last office visit date: 06/2024  Medication Refill Protocol Failed.  Medication Refill Protocol Failed. Courtesy Refill provided after reviewing per protocol guidelines. Writer confirmed, courtesy refill was not a duplicate.   
Dental

## 2024-11-20 NOTE — ED ADULT NURSE NOTE - PAIN RATING/NUMBER SCALE (0-10): ACTIVITY
Problem: Delirium, Risk for  Goal: # No symptoms of delirium  Description: Evaluate delirium symptoms under active problem when present  Outcome: Monitoring/Evaluating progress  Goal: # Verbalizes understanding of delirium preventive strategies  Description: Document on Patient Education Activity   Outcome: Monitoring/Evaluating progress     Problem: Pain  Goal: Acceptable pain level achieved/maintained at rest using appropriate pain scale for the patient  Outcome: Monitoring/Evaluating progress  Goal: Acceptable pain level achieved/maintained with activity using appropriate pain scale for the patient  Outcome: Monitoring/Evaluating progress  Goal: Acceptable pain level achieved/maintained without oversedation  Outcome: Monitoring/Evaluating progress     Problem: Skin Integrity Alteration  Goal: Skin remains intact with no new/deterioration of wound or pressure injury  Outcome: Monitoring/Evaluating progress      0

## 2025-01-05 NOTE — ED ADULT NURSE NOTE - BIRTH SEX
Patient ambulated to room with complaint of left abdominal pain and left flank pain. States he has history of kidney stones and this feels the same.  Last episode was 6 mo ago.  States he felt he needed to urinate this morning and had difficulty then pain started. States he has had urine output today   Female

## 2025-02-20 NOTE — DISCHARGE NOTE PROVIDER - COLLABORATE WITH
-- DO NOT REPLY / DO NOT REPLY ALL --  -- This inbox is not monitored. If this was sent to the wrong provider or department, reroute message to P [facility] Reroute pool. --  -- Message is from Engagement Center Operations (ECO) --      Message Type:  Refill Medication   Refill request for Pended medication named: Tramadol  Preferred pharmacy verified, and selected.   [id number] - [place], [place] - [address]    Is the patient OUT of Medication?  Yes and During Work Hours Medication Refills handled by [facility] Clinical - route as high priority to [facility]CO CLINICAL MSG pool. Patient has been advised it will be addressed within 1 business day.     Message: Patient is completely out of medication, please advise, Thank you.                Copied from CRM #[id number]. Topic: MW Medication/Rx - MW Rx Refill  >> Feb 20, 2025  8:17 AM Donna GIRON wrote:  Benson Penny called to request a medication refill and is out of medications or critically low during working hours. Medication is:  Listed on Med Management list. [facility] Clinical to process refill: Selected 'Wrap Up CRM' and created new Refill Med Encounter after clicking 'Convert to Clinical Call'. Selected reason for call 'Refill Request'. Pended medication. Sent Med template and routed as high priority to ECO CLINICAL MSG POOL. Readback full message.  
Medication: traMADol (ULTRAM) 50 MG tablet   Last office visit date: 08/26/24  Medication Refill Protocol Failed.  Failed criteria: Opioid Refill Protocol - 12 Month Protocol - ALWAYS forward to ordering provider Failed . Sent to clinician to review.       
Resident

## 2025-03-11 ENCOUNTER — APPOINTMENT (OUTPATIENT)
Dept: NEUROLOGY | Facility: CLINIC | Age: 80
End: 2025-03-11

## 2025-03-25 ENCOUNTER — EMERGENCY (EMERGENCY)
Facility: HOSPITAL | Age: 80
LOS: 1 days | Discharge: HOME | End: 2025-03-25
Attending: EMERGENCY MEDICINE
Payer: COMMERCIAL

## 2025-03-25 VITALS
RESPIRATION RATE: 16 BRPM | TEMPERATURE: 98 F | DIASTOLIC BLOOD PRESSURE: 79 MMHG | WEIGHT: 182.98 LBS | HEIGHT: 67 IN | OXYGEN SATURATION: 100 % | HEART RATE: 85 BPM | SYSTOLIC BLOOD PRESSURE: 169 MMHG

## 2025-03-25 DIAGNOSIS — D24.9 BENIGN NEOPLASM OF UNSPECIFIED BREAST: Chronic | ICD-10-CM

## 2025-03-25 PROCEDURE — 99285 EMERGENCY DEPT VISIT HI MDM: CPT

## 2025-03-26 VITALS
OXYGEN SATURATION: 99 % | DIASTOLIC BLOOD PRESSURE: 67 MMHG | RESPIRATION RATE: 17 BRPM | TEMPERATURE: 98 F | SYSTOLIC BLOOD PRESSURE: 117 MMHG | HEART RATE: 70 BPM

## 2025-03-26 LAB
ALBUMIN SERPL ELPH-MCNC: 3.5 G/DL — SIGNIFICANT CHANGE UP (ref 3.5–5)
ALP SERPL-CCNC: 73 U/L — SIGNIFICANT CHANGE UP (ref 40–120)
ALT FLD-CCNC: 20 U/L DA — SIGNIFICANT CHANGE UP (ref 10–60)
ANION GAP SERPL CALC-SCNC: 3 MMOL/L — LOW (ref 5–17)
AST SERPL-CCNC: 15 U/L — SIGNIFICANT CHANGE UP (ref 10–40)
BASOPHILS # BLD AUTO: 0.03 K/UL — SIGNIFICANT CHANGE UP (ref 0–0.2)
BASOPHILS NFR BLD AUTO: 0.4 % — SIGNIFICANT CHANGE UP (ref 0–2)
BILIRUB SERPL-MCNC: 0.3 MG/DL — SIGNIFICANT CHANGE UP (ref 0.2–1.2)
BUN SERPL-MCNC: 15 MG/DL — SIGNIFICANT CHANGE UP (ref 7–18)
CALCIUM SERPL-MCNC: 9.8 MG/DL — SIGNIFICANT CHANGE UP (ref 8.4–10.5)
CHLORIDE SERPL-SCNC: 102 MMOL/L — SIGNIFICANT CHANGE UP (ref 96–108)
CO2 SERPL-SCNC: 32 MMOL/L — HIGH (ref 22–31)
CREAT SERPL-MCNC: 0.74 MG/DL — SIGNIFICANT CHANGE UP (ref 0.5–1.3)
D DIMER BLD IA.RAPID-MCNC: 366 NG/ML DDU — HIGH
EGFR: 82 ML/MIN/1.73M2 — SIGNIFICANT CHANGE UP
EGFR: 82 ML/MIN/1.73M2 — SIGNIFICANT CHANGE UP
EOSINOPHIL # BLD AUTO: 0.1 K/UL — SIGNIFICANT CHANGE UP (ref 0–0.5)
EOSINOPHIL NFR BLD AUTO: 1.4 % — SIGNIFICANT CHANGE UP (ref 0–6)
FLUAV AG NPH QL: SIGNIFICANT CHANGE UP
FLUBV AG NPH QL: SIGNIFICANT CHANGE UP
GLUCOSE SERPL-MCNC: 116 MG/DL — HIGH (ref 70–99)
HCT VFR BLD CALC: 38.5 % — SIGNIFICANT CHANGE UP (ref 34.5–45)
HGB BLD-MCNC: 12.5 G/DL — SIGNIFICANT CHANGE UP (ref 11.5–15.5)
IMM GRANULOCYTES NFR BLD AUTO: 0.1 % — SIGNIFICANT CHANGE UP (ref 0–0.9)
LYMPHOCYTES # BLD AUTO: 1.38 K/UL — SIGNIFICANT CHANGE UP (ref 1–3.3)
LYMPHOCYTES # BLD AUTO: 19.5 % — SIGNIFICANT CHANGE UP (ref 13–44)
MCHC RBC-ENTMCNC: 27.3 PG — SIGNIFICANT CHANGE UP (ref 27–34)
MCHC RBC-ENTMCNC: 32.5 G/DL — SIGNIFICANT CHANGE UP (ref 32–36)
MCV RBC AUTO: 84.1 FL — SIGNIFICANT CHANGE UP (ref 80–100)
MONOCYTES # BLD AUTO: 0.64 K/UL — SIGNIFICANT CHANGE UP (ref 0–0.9)
MONOCYTES NFR BLD AUTO: 9.1 % — SIGNIFICANT CHANGE UP (ref 2–14)
NEUTROPHILS # BLD AUTO: 4.9 K/UL — SIGNIFICANT CHANGE UP (ref 1.8–7.4)
NEUTROPHILS NFR BLD AUTO: 69.5 % — SIGNIFICANT CHANGE UP (ref 43–77)
NRBC BLD AUTO-RTO: 0 /100 WBCS — SIGNIFICANT CHANGE UP (ref 0–0)
NT-PROBNP SERPL-SCNC: 29 PG/ML — SIGNIFICANT CHANGE UP (ref 0–450)
PLATELET # BLD AUTO: 228 K/UL — SIGNIFICANT CHANGE UP (ref 150–400)
POTASSIUM SERPL-MCNC: 3.6 MMOL/L — SIGNIFICANT CHANGE UP (ref 3.5–5.3)
POTASSIUM SERPL-SCNC: 3.6 MMOL/L — SIGNIFICANT CHANGE UP (ref 3.5–5.3)
PROT SERPL-MCNC: 7.7 G/DL — SIGNIFICANT CHANGE UP (ref 6–8.3)
RBC # BLD: 4.58 M/UL — SIGNIFICANT CHANGE UP (ref 3.8–5.2)
RBC # FLD: 14 % — SIGNIFICANT CHANGE UP (ref 10.3–14.5)
RSV RNA NPH QL NAA+NON-PROBE: SIGNIFICANT CHANGE UP
SARS-COV-2 RNA SPEC QL NAA+PROBE: SIGNIFICANT CHANGE UP
SODIUM SERPL-SCNC: 137 MMOL/L — SIGNIFICANT CHANGE UP (ref 135–145)
SOURCE RESPIRATORY: SIGNIFICANT CHANGE UP
T3 SERPL-MCNC: 118 NG/DL — SIGNIFICANT CHANGE UP (ref 80–200)
T4 AB SER-ACNC: 10.7 UG/DL — SIGNIFICANT CHANGE UP (ref 4.6–12)
T4 FREE SERPL-MCNC: 1.2 NG/DL — SIGNIFICANT CHANGE UP (ref 0.9–1.7)
TROPONIN I, HIGH SENSITIVITY RESULT: 4.7 NG/L — SIGNIFICANT CHANGE UP
TROPONIN I, HIGH SENSITIVITY RESULT: 5.8 NG/L — SIGNIFICANT CHANGE UP
TSH SERPL-MCNC: 1.3 UU/ML — SIGNIFICANT CHANGE UP (ref 0.34–4.82)
WBC # BLD: 7.06 K/UL — SIGNIFICANT CHANGE UP (ref 3.8–10.5)
WBC # FLD AUTO: 7.06 K/UL — SIGNIFICANT CHANGE UP (ref 3.8–10.5)

## 2025-03-26 PROCEDURE — 70490 CT SOFT TISSUE NECK W/O DYE: CPT | Mod: MC

## 2025-03-26 PROCEDURE — 84484 ASSAY OF TROPONIN QUANT: CPT

## 2025-03-26 PROCEDURE — 71045 X-RAY EXAM CHEST 1 VIEW: CPT | Mod: 26

## 2025-03-26 PROCEDURE — 84443 ASSAY THYROID STIM HORMONE: CPT

## 2025-03-26 PROCEDURE — 93005 ELECTROCARDIOGRAM TRACING: CPT

## 2025-03-26 PROCEDURE — 71275 CT ANGIOGRAPHY CHEST: CPT | Mod: 26

## 2025-03-26 PROCEDURE — 36000 PLACE NEEDLE IN VEIN: CPT

## 2025-03-26 PROCEDURE — 70490 CT SOFT TISSUE NECK W/O DYE: CPT | Mod: 26

## 2025-03-26 PROCEDURE — 71275 CT ANGIOGRAPHY CHEST: CPT | Mod: MC

## 2025-03-26 PROCEDURE — 85379 FIBRIN DEGRADATION QUANT: CPT

## 2025-03-26 PROCEDURE — 71045 X-RAY EXAM CHEST 1 VIEW: CPT

## 2025-03-26 PROCEDURE — 84436 ASSAY OF TOTAL THYROXINE: CPT

## 2025-03-26 PROCEDURE — 83880 ASSAY OF NATRIURETIC PEPTIDE: CPT

## 2025-03-26 PROCEDURE — 99285 EMERGENCY DEPT VISIT HI MDM: CPT | Mod: 25

## 2025-03-26 PROCEDURE — 80053 COMPREHEN METABOLIC PANEL: CPT

## 2025-03-26 PROCEDURE — 36415 COLL VENOUS BLD VENIPUNCTURE: CPT

## 2025-03-26 PROCEDURE — 87637 SARSCOV2&INF A&B&RSV AMP PRB: CPT

## 2025-03-26 PROCEDURE — 84439 ASSAY OF FREE THYROXINE: CPT

## 2025-03-26 PROCEDURE — 85025 COMPLETE CBC W/AUTO DIFF WBC: CPT

## 2025-03-26 PROCEDURE — 84480 ASSAY TRIIODOTHYRONINE (T3): CPT

## 2025-03-26 NOTE — ED PROVIDER NOTE - NSFOLLOWUPCLINICS_GEN_ALL_ED_FT
[facility]  Internal Medicine  [address] [place] [address]  Phone: [phone]  Fax: [phone]

## 2025-03-26 NOTE — ED PROVIDER NOTE - PATIENT PORTAL LINK FT
You can access the FollowMyHealth Patient Portal offered by [facility] by registering at the following website: [email or web address]. By joining [facility]’s FollowMyHealth portal, you will also be able to view your health information using other applications (apps) compatible with our system.

## 2025-03-26 NOTE — ED PROVIDER NOTE - PHYSICAL EXAMINATION
No distress  No calf tenderness No distress  Positive right neck mass consistent with goiter which patient is aware of.  No exophthalmos  No calf tenderness

## 2025-03-26 NOTE — ED ADULT NURSE NOTE - OBJECTIVE STATEMENT
Patient presents to ED reporting  SOB, Left shoulder Muscle aches x 6 days PMH HTN, Goiter,H. Pylori, Arthritis on Amlodipine

## 2025-03-26 NOTE — ED ADULT NURSE NOTE - NSFALLRISKASMT_ED_ALL_ED_DT
SUBJECTIVE:  Phu Hernandez is a 62 y.o. y/o female that presents with Other (Pt went to the ER after she injured her finger cleaning and heard a \"pop\". Pt states that the ER told her that it was not broken but mostly a tear and needed to follow up with primary. )  . HPI:  Pain is present in the right middle finger. Symptoms have been present for 1 week(s). The pain is intermittent. The patient describes the pain as mild. Unable to extend and the DIP. Inciting injury or history of trauma? Yes - jammed finger cleaning floor  Pain is aggravated by - nothing  Pain is relieved by - nothing  Radiation of the pain? No  Paresthesias of the extremities? No  Decreased ROM? No  Edema? Yes - mild        OBJECTIVE:  /68   Pulse 75   Temp 98.6 °F (37 °C) (Oral)   Resp 14   Ht 5' 4.5\" (1.638 m)   Wt 171 lb 6.4 oz (77.7 kg)   SpO2 96%   BMI 28.97 kg/m²   General appearance: alert, well appearing, and in no distress. HAND/WRIST EXAM:  Examination of the right hand/wrist shows: There is  deformity. Distal 3rd finger flexed and unable to actively extend  There is not erythema. There is not bony tenderness  There is  joint swelling  ROM diminished range of motion. ASSESSMENT & PLAN  Denisse López was seen today for other. Diagnoses and all orders for this visit:    Finger deformity, acquired, right      Return if symptoms worsen or fail to improve.    -Presentation is consistent with a tendon disruption, has lost extension of 3rd digit. Xray reviewed with patient. I advised that the next best step would be ortho evaluation and she agrees.   Will go to O walk in today.    -Patient advised to contact our office immediately if symptoms worsen or persist  -Patient counseled on conservative care including activity modification and OTC meds
26-Mar-2025 05:10

## 2025-03-26 NOTE — ED PROVIDER NOTE - NSFOLLOWUPINSTRUCTIONS_ED_ALL_ED_FT
Follow-up with your neck surgeon within 1 week as scheduled.  Return immediately if you feel short of breath, have pain, any concerns.  Return to ER immediately if your chest pain returns, if you have pain with radiation to arms, back or neck, if you feel short of breath, feel weak, feel fast or pounding heart beating, if you have any fever or vomiting.  If you need assistance with follow up appointments our Care Coordinator can be reached at [phone]. In addition the [facility] is located at [address] [place] [address], tel: [phone].

## 2025-03-26 NOTE — ED ADULT NURSE NOTE - NSFALLUNIVINTERV_ED_ALL_ED
Bed/Stretcher in lowest position, wheels locked, appropriate side rails in place/Call bell, personal items and telephone in reach/Instruct patient to call for assistance before getting out of bed/chair/stretcher/Non-slip footwear applied when patient is off stretcher/[place] to call system/Physically safe environment - no spills, clutter or unnecessary equipment/Purposeful proactive rounding/Room/bathroom lighting operational, light cord in reach

## 2025-03-26 NOTE — ED PROVIDER NOTE - OBJECTIVE STATEMENT
80-year-old female chief complaint of exertional shortness of breath, chest discomfort described as acid/burning irritation and discomfort to left shoulder and between shoulder blades.  Patient states symptoms for approximately 5 days.  No reported fever, no productive coughing no nausea, no vomiting.

## 2025-03-26 NOTE — ED PROVIDER NOTE - CLINICAL SUMMARY MEDICAL DECISION MAKING FREE TEXT BOX
CT ordered to rule out PE given elevated D-dimer and complaints of chest discomfort and shortness of breath.  CT reported: No pulmonary embolus. 3.5 cm heterogeneous mass centered on the right thyroid lobe, new since 2/11/2023, with associated leftward tracheal deviation. Dedicated CT of the neck is recommended for further evaluation, given incomplete visualization. Additionally follow-up thyroid sonogram may be obtained for further evaluation.  TSH wnl.   Patient agrees with CT of neck. CT ordered to rule out PE given elevated D-dimer and complaints of chest discomfort and shortness of breath.  CT reported: No pulmonary embolus. 3.5 cm heterogeneous mass centered on the right thyroid lobe, new since 2/11/2023, with associated leftward tracheal deviation. Dedicated CT of the neck is recommended for further evaluation, given incomplete visualization. Additionally follow-up thyroid sonogram may be obtained for further evaluation.  TSH wnl.  Patient agrees with CT of neck.  CT neck reported: 5 cm hypoattenuating mass in the right lobe of the thyroid gland, with regional mass effect and leftward deviation of the trachea. Findings were   not present on the prior CTA neck from July 2023 and are highly concerning for malignant neoplasm. Further workup with tissue sampling highly recommended.   8:45 AM patient states she has not no neck mass for the last year, patient is in no respiratory distress.  Patient has oncoming appointment within 1 week with thyroid surgeon for rebiopsy and possible resection.  Patient states biopsy 1 year ago demonstrated no malignancy.  ACS ruled out as EKG has no ischemic changes or evidence of STEMI, high-sensitivity troponin is negative and patient has no cardiac risk factors.  PE ruled out on CTA chest..  Chest x-ray shows no pneumothorax or pulmonary pathology.   Pt is well appearing, has no new complaints and able to walk with normal gait. Pt is stable for discharge and follow up with medical doctor. Pt educated on care and need for follow up. Discussed anticipatory guidance and return precautions. Questions answered. I had a detailed discussion with the patient regarding the historical points, exam findings, and any diagnostic results supporting the discharge diagnosis.

## 2025-04-02 NOTE — ED ADULT TRIAGE NOTE - BP NONINVASIVE DIASTOLIC (MM HG)
Pt has a normal liver, thyroid and kidney function.  Cholesterol within a healthy range.  Blood sugar normal and no evidence of diabetes or prediabetes.  Electrolytes appropriate.   No anemia present on lab testing.  Encourage healthy diet and lifestyle.  Great numbers!!   80